# Patient Record
Sex: MALE | Race: OTHER | HISPANIC OR LATINO
[De-identification: names, ages, dates, MRNs, and addresses within clinical notes are randomized per-mention and may not be internally consistent; named-entity substitution may affect disease eponyms.]

---

## 2018-02-12 ENCOUNTER — LABORATORY RESULT (OUTPATIENT)
Age: 48
End: 2018-02-12

## 2018-02-12 ENCOUNTER — OUTPATIENT (OUTPATIENT)
Dept: OUTPATIENT SERVICES | Facility: HOSPITAL | Age: 48
LOS: 1 days | End: 2018-02-12
Payer: SELF-PAY

## 2018-02-12 ENCOUNTER — APPOINTMENT (OUTPATIENT)
Dept: INTERNAL MEDICINE | Facility: CLINIC | Age: 48
End: 2018-02-12

## 2018-02-12 VITALS
SYSTOLIC BLOOD PRESSURE: 123 MMHG | DIASTOLIC BLOOD PRESSURE: 60 MMHG | HEIGHT: 67 IN | BODY MASS INDEX: 26.06 KG/M2 | OXYGEN SATURATION: 98 % | WEIGHT: 166 LBS | HEART RATE: 72 BPM

## 2018-02-12 DIAGNOSIS — Z00.00 ENCOUNTER FOR GENERAL ADULT MEDICAL EXAMINATION W/OUT ABNORMAL FINDINGS: ICD-10-CM

## 2018-02-12 DIAGNOSIS — I10 ESSENTIAL (PRIMARY) HYPERTENSION: ICD-10-CM

## 2018-02-12 LAB
ALBUMIN SERPL ELPH-MCNC: 4.7 G/DL — SIGNIFICANT CHANGE UP (ref 3.3–5)
ALP SERPL-CCNC: 65 U/L — SIGNIFICANT CHANGE UP (ref 40–120)
ALT FLD-CCNC: 28 U/L — SIGNIFICANT CHANGE UP (ref 10–45)
ANION GAP SERPL CALC-SCNC: 16 MMOL/L — SIGNIFICANT CHANGE UP (ref 5–17)
AST SERPL-CCNC: 17 U/L — SIGNIFICANT CHANGE UP (ref 10–40)
BILIRUB SERPL-MCNC: 0.3 MG/DL — SIGNIFICANT CHANGE UP (ref 0.2–1.2)
BUN SERPL-MCNC: 17 MG/DL — SIGNIFICANT CHANGE UP (ref 7–23)
CALCIUM SERPL-MCNC: 9.4 MG/DL — SIGNIFICANT CHANGE UP (ref 8.4–10.5)
CHLORIDE SERPL-SCNC: 99 MMOL/L — SIGNIFICANT CHANGE UP (ref 96–108)
CHOLEST SERPL-MCNC: 206 MG/DL — HIGH (ref 10–199)
CO2 SERPL-SCNC: 27 MMOL/L — SIGNIFICANT CHANGE UP (ref 22–31)
CREAT SERPL-MCNC: 0.76 MG/DL — SIGNIFICANT CHANGE UP (ref 0.5–1.3)
GLUCOSE SERPL-MCNC: 86 MG/DL — SIGNIFICANT CHANGE UP (ref 70–99)
HBA1C BLD-MCNC: 5.4 % — SIGNIFICANT CHANGE UP (ref 4–5.6)
HCT VFR BLD CALC: 45.4 % — SIGNIFICANT CHANGE UP (ref 39–50)
HDLC SERPL-MCNC: 62 MG/DL — SIGNIFICANT CHANGE UP (ref 40–125)
HGB BLD-MCNC: 14.6 G/DL — SIGNIFICANT CHANGE UP (ref 13–17)
LIPID PNL WITH DIRECT LDL SERPL: 127 MG/DL — SIGNIFICANT CHANGE UP
MCHC RBC-ENTMCNC: 30.4 PG — SIGNIFICANT CHANGE UP (ref 27–34)
MCHC RBC-ENTMCNC: 32.2 GM/DL — SIGNIFICANT CHANGE UP (ref 32–36)
MCV RBC AUTO: 94.6 FL — SIGNIFICANT CHANGE UP (ref 80–100)
PLATELET # BLD AUTO: 255 K/UL — SIGNIFICANT CHANGE UP (ref 150–400)
POTASSIUM SERPL-MCNC: 4.4 MMOL/L — SIGNIFICANT CHANGE UP (ref 3.5–5.3)
POTASSIUM SERPL-SCNC: 4.4 MMOL/L — SIGNIFICANT CHANGE UP (ref 3.5–5.3)
PROT SERPL-MCNC: 7.9 G/DL — SIGNIFICANT CHANGE UP (ref 6–8.3)
RBC # BLD: 4.8 M/UL — SIGNIFICANT CHANGE UP (ref 4.2–5.8)
RBC # FLD: 13.7 % — SIGNIFICANT CHANGE UP (ref 10.3–14.5)
SODIUM SERPL-SCNC: 142 MMOL/L — SIGNIFICANT CHANGE UP (ref 135–145)
T4 FREE+ TSH PNL SERPL: 1.11 UIU/ML — SIGNIFICANT CHANGE UP (ref 0.27–4.2)
TOTAL CHOLESTEROL/HDL RATIO MEASUREMENT: 3.3 RATIO — LOW (ref 3.4–9.6)
TRIGL SERPL-MCNC: 86 MG/DL — SIGNIFICANT CHANGE UP (ref 10–149)
WBC # BLD: 5.65 K/UL — SIGNIFICANT CHANGE UP (ref 3.8–10.5)
WBC # FLD AUTO: 5.65 K/UL — SIGNIFICANT CHANGE UP (ref 3.8–10.5)

## 2018-02-12 PROCEDURE — 85027 COMPLETE CBC AUTOMATED: CPT

## 2018-02-12 PROCEDURE — 80053 COMPREHEN METABOLIC PANEL: CPT

## 2018-02-12 PROCEDURE — 90688 IIV4 VACCINE SPLT 0.5 ML IM: CPT

## 2018-02-12 PROCEDURE — G0008: CPT

## 2018-02-12 PROCEDURE — 83036 HEMOGLOBIN GLYCOSYLATED A1C: CPT

## 2018-02-12 PROCEDURE — 84443 ASSAY THYROID STIM HORMONE: CPT

## 2018-02-12 PROCEDURE — 80061 LIPID PANEL: CPT

## 2018-02-12 PROCEDURE — G0463: CPT

## 2018-02-23 DIAGNOSIS — K59.00 CONSTIPATION, UNSPECIFIED: ICD-10-CM

## 2018-02-23 DIAGNOSIS — Z23 ENCOUNTER FOR IMMUNIZATION: ICD-10-CM

## 2018-09-11 ENCOUNTER — APPOINTMENT (OUTPATIENT)
Age: 48
End: 2018-09-11

## 2018-09-11 ENCOUNTER — OUTPATIENT (OUTPATIENT)
Dept: OUTPATIENT SERVICES | Facility: HOSPITAL | Age: 48
LOS: 1 days | End: 2018-09-11

## 2019-03-13 ENCOUNTER — APPOINTMENT (OUTPATIENT)
Dept: INTERNAL MEDICINE | Facility: CLINIC | Age: 49
End: 2019-03-13

## 2019-03-13 ENCOUNTER — OUTPATIENT (OUTPATIENT)
Dept: OUTPATIENT SERVICES | Facility: HOSPITAL | Age: 49
LOS: 1 days | End: 2019-03-13
Payer: SELF-PAY

## 2019-03-13 VITALS — SYSTOLIC BLOOD PRESSURE: 110 MMHG | DIASTOLIC BLOOD PRESSURE: 70 MMHG | HEART RATE: 80 BPM

## 2019-03-13 DIAGNOSIS — I10 ESSENTIAL (PRIMARY) HYPERTENSION: ICD-10-CM

## 2019-03-13 DIAGNOSIS — Z00.00 ENCOUNTER FOR GENERAL ADULT MEDICAL EXAMINATION W/OUT ABNORMAL FINDINGS: ICD-10-CM

## 2019-03-13 DIAGNOSIS — L85.3 XEROSIS CUTIS: ICD-10-CM

## 2019-03-13 PROCEDURE — 90688 IIV4 VACCINE SPLT 0.5 ML IM: CPT

## 2019-03-13 PROCEDURE — G0008: CPT

## 2019-03-13 PROCEDURE — G0463: CPT

## 2019-03-13 NOTE — HISTORY REVIEWED
[Medications and Allergies reviewed] : Medications and allergies reviewed. [History reviewed] : History reviewed.

## 2019-03-14 NOTE — HISTORY OF PRESENT ILLNESS
[Health Maintenance] : health maintenance [___ Year(s) Ago] : [unfilled] year(s) ago [Good] : good [Sexually Active] : the patient is sexually active [Reviewed and Current] : risk screening reviewed and current [Up to Date] : up to date [Reg. Dental Visits] : He does not have regular dental visits [Vision Problems] : He denies vision problems [Hearing Loss] : He denies hearing loss [Healthy Diet] : He does not have a healthy diet [Weight Concerns] : He does not have any weight concerns [Regular Exercise] : He does not exercise regularly [Tobacco Use] : He does not use tobacco [Drug Abuse] : He denies drug use [FreeTextEntry1] : Annual CPE [de-identified] : Patient is a 48 yr-old male with a PMHx inguinal hernia s/p repair, hemorrhoid, vitiligo, constipation presents for CPE. Patient has no acute complaints today. Patient has one episode of constipation every few months and noted trace amount of blood in stool after prolonged straining. He reports normal colonoscopy in the past. He is in usual state of health, reports excoriations, dry skin on legs and back and dandruff. No fever, chills, night sweats, chest pain, dizziness, palpitations, unintentional weight loss, abdominal pain, black stool, diarrhea/mucus, dysuria, urinary frequency.\par \par Social hx: patient works as a . Lives with wife and children. Patient denies any hx of STD. No cigarette smoking or illicit drug use. Social drinking. \par Family hx: paternal uncle has prostate cancer. Denies family hx of colon cancer. \par \par

## 2019-03-14 NOTE — REVIEW OF SYSTEMS
[Constipation] : constipation [Fever] : no fever [Chills] : no chills [Feeling Poorly] : not feeling poorly [Feeling Tired] : not feeling tired [Eye Pain] : no eye pain [Earache] : no earache [Heart Rate Is Slow] : the heart rate was not slow [Heart Rate Is Fast] : the heart rate was not fast [Shortness Of Breath] : no shortness of breath [Abdominal Pain] : no abdominal pain [Vomiting] : no vomiting [Dysuria] : no dysuria [Arthralgias] : no arthralgias [Joint Pain] : no joint pain [Skin Lesions] : no skin lesions [Skin Wound] : no skin wound [Confused] : no confusion [Suicidal] : not suicidal [Sleep Disturbances] : no sleep disturbances [Anxiety] : no anxiety [Depression] : no depression [Proptosis] : no proptosis [Hot Flashes] : no hot flashes

## 2019-03-14 NOTE — ASSESSMENT
[FreeTextEntry1] : Patient is a 47 yr-old male with a past medical history of  inguinal hernia s/p repair & hemorrhoid, vitiligo, here for CPE\par \par #BRBPR: associated with constipation and valsalva with BMs. WIll need to rule out other etiologies, including diverticuloses and cancer\par - Anal exam today notable for genital/anal vitiligo but no evidence of external hemorrhoids\par - FIT test and referral given\par \par #Constipation: was on colace previously\par -Prescribed miralax BID as needed for constipation\par -Patient advised to stay hydrate and eat a fiber-rich diet\par - Colonoscopy referral given\par \par #Dry Skin: with excoriation\par -Aquaphore prescribed, instructed to avoid scartching\par -Counseled on using Dove soap\par \par # HCM\par - Patient inquired about PSA screening. Patient currently does not have any symptoms of prostate cancer: wt loss fatigue, weak stream, hematuria, back pain. Discussed with patient the option of PSA screening that PSA is not recommend by [facility]. Patient decided to proceed with PSA screening given his uncle has h/o prostate cancer with the understanding it mightbe flasely positive and may lead to further unnecessary invasivstesting.\par -Flu vaccine given today\par -Check CBC, CMP, A1c, lipid profile \par

## 2019-03-14 NOTE — PHYSICAL EXAM
[General Appearance - Alert] : alert [General Appearance - In No Acute Distress] : in no acute distress [General Appearance - Well Nourished] : well nourished [General Appearance - Well Developed] : well developed [General Appearance - Well-Appearing] : healthy appearing [Sclera] : the sclera and conjunctiva were normal [PERRL With Normal Accommodation] : pupils were equal in size, round, and reactive to light [Extraocular Movements] : extraocular movements were intact [Strabismus] : no strabismus was seen [Optic Disc Abnormality] : the optic disc were normal in size and color [Retina] : no retinal hemorrhages, vessel changes or exudates seen on fundoscopic exam [Outer Ear] : the ears and nose were normal in appearance [Hearing Threshold Finger Rub Not Moody] : hearing was normal [Examination Of The Oral Cavity] : the lips and gums were normal [Both Tympanic Membranes Were Examined] : both tympanic membranes were normal [Nasal Cavity] : the nasal mucosa and septum were normal [Oropharynx] : the oropharynx was normal [Neck Appearance] : the appearance of the neck was normal [Neck Cervical Mass (___cm)] : no neck mass was observed [Jugular Venous Distention Increased] : there was no jugular-venous distention [Thyroid Diffuse Enlargement] : the thyroid was not enlarged [Thyroid Nodule] : there were no palpable thyroid nodules [] : no respiratory distress [Respiration, Rhythm And Depth] : normal respiratory rhythm and effort [Exaggerated Use Of Accessory Muscles For Inspiration] : no accessory muscle use [Auscultation Breath Sounds / Voice Sounds] : lungs were clear to auscultation bilaterally [Chest Palpation] : palpation of the chest revealed no abnormalities [Heart Rate And Rhythm] : heart rate was normal and rhythm regular [Heart Sounds] : normal S1 and S2 [Heart Sounds Gallop] : no gallops [Murmurs] : no murmurs [Heart Sounds Pericardial Friction Rub] : no pericardial rub [Arterial Pulses Carotid] : carotid pulses were normal with no bruits [Edema] : there was no peripheral edema [Veins - Varicosity Changes] : there were no varicosital changes [Bowel Sounds] : normal bowel sounds [Abdomen Soft] : soft [Abdomen Tenderness] : non-tender [Cervical Lymph Nodes Enlarged Posterior Bilaterally] : posterior cervical [Cervical Lymph Nodes Enlarged Anterior Bilaterally] : anterior cervical [Supraclavicular Lymph Nodes Enlarged Bilaterally] : supraclavicular [No Spinal Tenderness] : no spinal tenderness [Abnormal Walk] : normal gait [Nail Clubbing] : no clubbing  or cyanosis of the fingernails [Musculoskeletal - Swelling] : no joint swelling seen [Motor Tone] : muscle strength and tone were normal [Skin Color & Pigmentation] : normal skin color and pigmentation [Skin Turgor] : normal skin turgor [Sensation] : the sensory exam was normal to light touch and pinprick [Motor Exam] : the motor exam was normal [No Focal Deficits] : no focal deficits [Oriented To Time, Place, And Person] : oriented to person, place, and time [Affect] : the affect was normal [Mood] : the mood was normal [Full Pulse] : the pedal pulses are present [FreeTextEntry1] : dry skin, L exocriation with superficial peeling

## 2019-03-14 NOTE — HEALTH RISK ASSESSMENT
[Good] : ~his/her~  mood as  good [0-5] : 0-5 [Patient reported colonoscopy was normal] : Patient reported colonoscopy was normal [] : No

## 2019-03-15 DIAGNOSIS — K92.1 MELENA: ICD-10-CM

## 2019-03-15 DIAGNOSIS — L85.3 XEROSIS CUTIS: ICD-10-CM

## 2019-03-15 DIAGNOSIS — K59.00 CONSTIPATION, UNSPECIFIED: ICD-10-CM

## 2019-06-11 ENCOUNTER — APPOINTMENT (OUTPATIENT)
Dept: GASTROENTEROLOGY | Facility: HOSPITAL | Age: 49
End: 2019-06-11

## 2019-06-11 ENCOUNTER — OUTPATIENT (OUTPATIENT)
Dept: OUTPATIENT SERVICES | Facility: HOSPITAL | Age: 49
LOS: 1 days | End: 2019-06-11
Payer: SELF-PAY

## 2019-06-11 VITALS
HEIGHT: 67 IN | BODY MASS INDEX: 26.06 KG/M2 | WEIGHT: 166 LBS | DIASTOLIC BLOOD PRESSURE: 84 MMHG | HEART RATE: 66 BPM | SYSTOLIC BLOOD PRESSURE: 156 MMHG | RESPIRATION RATE: 14 BRPM

## 2019-06-11 DIAGNOSIS — K59.00 CONSTIPATION, UNSPECIFIED: ICD-10-CM

## 2019-06-11 DIAGNOSIS — Z87.891 PERSONAL HISTORY OF NICOTINE DEPENDENCE: ICD-10-CM

## 2019-06-11 DIAGNOSIS — K92.1 MELENA: ICD-10-CM

## 2019-06-11 DIAGNOSIS — K31.9 DISEASE OF STOMACH AND DUODENUM, UNSPECIFIED: ICD-10-CM

## 2019-06-11 PROCEDURE — G0463: CPT

## 2019-06-11 NOTE — PHYSICAL EXAM
[General Appearance - Well Nourished] : well nourished [Sclera] : the sclera and conjunctiva were normal [General Appearance - Well Developed] : well developed [PERRL With Normal Accommodation] : pupils were equal in size, round, and reactive to light [Extraocular Movements] : extraocular movements were intact [Hearing Threshold Finger Rub Not [place]] : hearing was normal [Outer Ear] : the ears and nose were normal in appearance [Neck Appearance] : the appearance of the neck was normal [Neck Cervical Mass (___cm)] : no neck mass was observed [Respiration, Rhythm And Depth] : normal respiratory rhythm and effort [Exaggerated Use Of Accessory Muscles For Inspiration] : no accessory muscle use [Auscultation Breath Sounds / Voice Sounds] : lungs were clear to auscultation bilaterally [Heart Sounds] : normal S1 and S2 [Murmurs] : no murmurs [Heart Sounds Pericardial Friction Rub] : no pericardial rub [Bowel Sounds] : normal bowel sounds [Abdomen Soft] : soft [Abdomen Tenderness] : non-tender [Cervical Lymph Nodes Enlarged Posterior Bilaterally] : posterior cervical [Cervical Lymph Nodes Enlarged Anterior Bilaterally] : anterior cervical [Supraclavicular Lymph Nodes Enlarged Bilaterally] : supraclavicular [Nail Clubbing] : no clubbing  or cyanosis of the fingernails [Skin Turgor] : normal skin turgor [Motor Tone] : muscle strength and tone were normal [] : no rash [Sensation] : the sensory exam was normal to light touch and pinprick [Motor Exam] : the motor exam was normal [Mood] : the mood was normal [Affect] : the affect was normal

## 2019-06-11 NOTE — HISTORY OF PRESENT ILLNESS
[de-identified] : 48 yr-old male with a PMHx inguinal hernia s/p repair presents with intermittent trace hematochezia over the past 2 years. Pt states that he used to constipated, but lately has been taking a lot water and fiber (fruits, vegetables) with resultant 1 soft BM a day. Pt denies straining or dyschezia. The patient denies heartburn, dyspepsia, dysphagia, change in bowel habit, melena, weight loss, anemia or hematemesis. Pt denies family history of gallstones, colorectal cancer, polyps, breast, ovarian cancer. \par \par Pt's last colonoscopy was 2013- at that time showed internal hemorrhoids\par EGD 2009-normal.

## 2019-06-11 NOTE — ASSESSMENT
[FreeTextEntry1] : Impression:\par 1) Constipation- resolved with fiber. Differential diagnosis: slow transit constipation, dyssynergic defecation, IBS\par 2) Intermittent hematochezia- Differential diagnosis includes diverticulosis, anal fissure/hemorrhoids, angiectasia, colorectal cancer\par \par Recommendations:\par -Plan for colonoscopy\par Risks and benefits of the procedure, including: perforation (tear in the colon), bleeding, reaction to sedation, and possible risk for surgical resection and colostomy bag explained\par \par

## 2019-06-27 ENCOUNTER — OUTPATIENT (OUTPATIENT)
Dept: OUTPATIENT SERVICES | Facility: HOSPITAL | Age: 49
LOS: 1 days | End: 2019-06-27
Payer: SELF-PAY

## 2019-06-27 DIAGNOSIS — K59.00 CONSTIPATION, UNSPECIFIED: ICD-10-CM

## 2019-06-27 PROCEDURE — 45378 DIAGNOSTIC COLONOSCOPY: CPT | Mod: GC

## 2019-06-28 PROCEDURE — 45378 DIAGNOSTIC COLONOSCOPY: CPT

## 2020-08-04 ENCOUNTER — LABORATORY RESULT (OUTPATIENT)
Age: 50
End: 2020-08-04

## 2020-08-04 ENCOUNTER — OUTPATIENT (OUTPATIENT)
Dept: OUTPATIENT SERVICES | Facility: HOSPITAL | Age: 50
LOS: 1 days | End: 2020-08-04
Payer: SELF-PAY

## 2020-08-04 ENCOUNTER — APPOINTMENT (OUTPATIENT)
Dept: INTERNAL MEDICINE | Facility: CLINIC | Age: 50
End: 2020-08-04

## 2020-08-04 VITALS
DIASTOLIC BLOOD PRESSURE: 72 MMHG | WEIGHT: 167 LBS | BODY MASS INDEX: 26.21 KG/M2 | SYSTOLIC BLOOD PRESSURE: 124 MMHG | HEIGHT: 67 IN

## 2020-08-04 DIAGNOSIS — I10 ESSENTIAL (PRIMARY) HYPERTENSION: ICD-10-CM

## 2020-08-04 DIAGNOSIS — K64.9 UNSPECIFIED HEMORRHOIDS: ICD-10-CM

## 2020-08-04 DIAGNOSIS — L80 VITILIGO: ICD-10-CM

## 2020-08-04 PROCEDURE — 86706 HEP B SURFACE ANTIBODY: CPT

## 2020-08-04 PROCEDURE — 36415 COLL VENOUS BLD VENIPUNCTURE: CPT

## 2020-08-04 PROCEDURE — 85027 COMPLETE CBC AUTOMATED: CPT

## 2020-08-04 PROCEDURE — 86480 TB TEST CELL IMMUN MEASURE: CPT

## 2020-08-04 PROCEDURE — 86705 HEP B CORE ANTIBODY IGM: CPT

## 2020-08-04 PROCEDURE — 86704 HEP B CORE ANTIBODY TOTAL: CPT

## 2020-08-04 PROCEDURE — G0463: CPT

## 2020-08-04 PROCEDURE — 86762 RUBELLA ANTIBODY: CPT

## 2020-08-04 PROCEDURE — 86765 RUBEOLA ANTIBODY: CPT

## 2020-08-04 PROCEDURE — 87340 HEPATITIS B SURFACE AG IA: CPT

## 2020-08-04 PROCEDURE — 83036 HEMOGLOBIN GLYCOSYLATED A1C: CPT

## 2020-08-04 PROCEDURE — 86735 MUMPS ANTIBODY: CPT

## 2020-08-04 PROCEDURE — 80053 COMPREHEN METABOLIC PANEL: CPT

## 2020-08-04 PROCEDURE — 87389 HIV-1 AG W/HIV-1&-2 AB AG IA: CPT

## 2020-08-05 LAB
A1C WITH ESTIMATED AVERAGE GLUCOSE RESULT: 5.4 % — SIGNIFICANT CHANGE UP (ref 4–5.6)
ALBUMIN SERPL ELPH-MCNC: 4.5 G/DL — SIGNIFICANT CHANGE UP (ref 3.3–5)
ALP SERPL-CCNC: 74 U/L — SIGNIFICANT CHANGE UP (ref 40–120)
ALT FLD-CCNC: 20 U/L — SIGNIFICANT CHANGE UP (ref 10–45)
ANION GAP SERPL CALC-SCNC: 11 MMOL/L — SIGNIFICANT CHANGE UP (ref 5–17)
AST SERPL-CCNC: 18 U/L — SIGNIFICANT CHANGE UP (ref 10–40)
BILIRUB SERPL-MCNC: 0.4 MG/DL — SIGNIFICANT CHANGE UP (ref 0.2–1.2)
BUN SERPL-MCNC: 14 MG/DL — SIGNIFICANT CHANGE UP (ref 7–23)
CALCIUM SERPL-MCNC: 9 MG/DL — SIGNIFICANT CHANGE UP (ref 8.4–10.5)
CHLORIDE SERPL-SCNC: 103 MMOL/L — SIGNIFICANT CHANGE UP (ref 96–108)
CO2 SERPL-SCNC: 24 MMOL/L — SIGNIFICANT CHANGE UP (ref 22–31)
CREAT SERPL-MCNC: 0.66 MG/DL — SIGNIFICANT CHANGE UP (ref 0.5–1.3)
ESTIMATED AVERAGE GLUCOSE: 108 MG/DL — SIGNIFICANT CHANGE UP (ref 68–114)
GLUCOSE SERPL-MCNC: 92 MG/DL — SIGNIFICANT CHANGE UP (ref 70–99)
HBV CORE AB SER-ACNC: SIGNIFICANT CHANGE UP
HBV CORE IGM SER-ACNC: SIGNIFICANT CHANGE UP
HBV SURFACE AB SER-ACNC: SIGNIFICANT CHANGE UP
HBV SURFACE AG SER-ACNC: SIGNIFICANT CHANGE UP
HCT VFR BLD CALC: 44.3 % — SIGNIFICANT CHANGE UP (ref 39–50)
HGB BLD-MCNC: 14.5 G/DL — SIGNIFICANT CHANGE UP (ref 13–17)
HIV 1+2 AB+HIV1 P24 AG SERPL QL IA: SIGNIFICANT CHANGE UP
MCHC RBC-ENTMCNC: 30.1 PG — SIGNIFICANT CHANGE UP (ref 27–34)
MCHC RBC-ENTMCNC: 32.7 GM/DL — SIGNIFICANT CHANGE UP (ref 32–36)
MCV RBC AUTO: 92.1 FL — SIGNIFICANT CHANGE UP (ref 80–100)
MEV IGG SER-ACNC: 280 AU/ML — SIGNIFICANT CHANGE UP
MEV IGG+IGM SER-IMP: POSITIVE — SIGNIFICANT CHANGE UP
MUV AB SER-ACNC: POSITIVE — SIGNIFICANT CHANGE UP
MUV IGG FLD-ACNC: 21.1 AU/ML — SIGNIFICANT CHANGE UP
PLATELET # BLD AUTO: 282 K/UL — SIGNIFICANT CHANGE UP (ref 150–400)
POTASSIUM SERPL-MCNC: 4.3 MMOL/L — SIGNIFICANT CHANGE UP (ref 3.5–5.3)
POTASSIUM SERPL-SCNC: 4.3 MMOL/L — SIGNIFICANT CHANGE UP (ref 3.5–5.3)
PROT SERPL-MCNC: 7 G/DL — SIGNIFICANT CHANGE UP (ref 6–8.3)
RBC # BLD: 4.81 M/UL — SIGNIFICANT CHANGE UP (ref 4.2–5.8)
RBC # FLD: 13.2 % — SIGNIFICANT CHANGE UP (ref 10.3–14.5)
RUBV IGG SER-ACNC: 2.2 INDEX — SIGNIFICANT CHANGE UP
RUBV IGG SER-IMP: POSITIVE — SIGNIFICANT CHANGE UP
SODIUM SERPL-SCNC: 138 MMOL/L — SIGNIFICANT CHANGE UP (ref 135–145)
WBC # BLD: 5.17 K/UL — SIGNIFICANT CHANGE UP (ref 3.8–10.5)
WBC # FLD AUTO: 5.17 K/UL — SIGNIFICANT CHANGE UP (ref 3.8–10.5)

## 2020-08-05 RX ORDER — WHITE PETROLATUM 1.75 OZ
OINTMENT TOPICAL TWICE DAILY
Qty: 1 | Refills: 2 | Status: DISCONTINUED | COMMUNITY
Start: 2019-03-13 | End: 2020-08-05

## 2020-08-05 RX ORDER — POLYETHYLENE GLYCOL 3350 AND ELECTROLYTES WITH LEMON FLAVOR 236; 22.74; 6.74; 5.86; 2.97 G/4L; G/4L; G/4L; G/4L; G/4L
236 POWDER, FOR SOLUTION ORAL
Qty: 1 | Refills: 0 | Status: DISCONTINUED | COMMUNITY
Start: 2019-06-11 | End: 2020-08-05

## 2020-08-05 RX ORDER — POLYETHYLENE GLYCOL 3350 17 G/17G
17 POWDER, FOR SOLUTION ORAL TWICE DAILY
Qty: 1 | Refills: 1 | Status: DISCONTINUED | COMMUNITY
Start: 2019-03-13 | End: 2020-08-05

## 2020-08-06 LAB
GAMMA INTERFERON BACKGROUND BLD IA-ACNC: 0.02 IU/ML — SIGNIFICANT CHANGE UP
M TB IFN-G BLD-IMP: NEGATIVE — SIGNIFICANT CHANGE UP
M TB IFN-G CD4+ BCKGRND COR BLD-ACNC: -0.01 IU/ML — SIGNIFICANT CHANGE UP
M TB IFN-G CD4+CD8+ BCKGRND COR BLD-ACNC: -0.01 IU/ML — SIGNIFICANT CHANGE UP
QUANT TB PLUS MITOGEN MINUS NIL: 8.21 IU/ML — SIGNIFICANT CHANGE UP

## 2020-08-07 NOTE — HEALTH RISK ASSESSMENT
[Very Good] : ~his/her~  mood as very good [Monthly or less (1 pt)] : Monthly or less (1 point) [1 or 2 (0 pts)] : 1 or 2 (0 points) [Never (0 pts)] : Never (0 points) [0] : 2) Feeling down, depressed, or hopeless: Not at all (0) [Patient reported colonoscopy was abnormal] : Patient reported colonoscopy was abnormal [HIV Test offered] : HIV Test offered [None] : None [With Family] : lives with family [Employed] : employed [] :  [Feels Safe at Home] : Feels safe at home [Fully functional (bathing, dressing, toileting, transferring, walking, feeding)] : Fully functional (bathing, dressing, toileting, transferring, walking, feeding) [Fully functional (using the telephone, shopping, preparing meals, housekeeping, doing laundry, using] : Fully functional and needs no help or supervision to perform IADLs (using the telephone, shopping, preparing meals, housekeeping, doing laundry, using transportation, managing medications and managing finances) [] : No [[id number]] : 0 [Change in mental status noted] : No change in mental status noted [Language] : denies difficulty with language [Behavior] : denies difficulty with behavior [Reasoning] : denies difficulty with reasoning [Handling Complex Tasks] : denies difficulty handling complex tasks [ColonoscopyDate] : 06/2019 [FreeTextEntry2] :  [ColonoscopyComments] : internal & external hemorrhoids

## 2020-08-07 NOTE — ASSESSMENT
[FreeTextEntry1] : 49M with hemorrhoids 2/2 constipation/straining otherwise healthy presents for CPE\par \par Doing well\par \par Will check routine labs today, A1c.\par \par Constipation stable, Dulcolax as needed.\par \par Vitiligo non-bothersome, limited in area, actually states it is regressing. Dermatology referral placed if patient desires follow up, previously used a cream and light therapy\par \par Up to date on colonoscopy, 2019 showing hemorrhoids.\par \par Flu shot and Zoster at next visit.\par \par Had shared decision making regarding prostate screening given family history, currently asymptomatic, decided not to do PSA.\par \par Also will send for vaccine titers given he is applying for immigration status and will need these labs.\par \par RTC as needed, f/u in 6 months. \par \par He will call later this week for results to be sent to him and his .

## 2020-08-07 NOTE — PHYSICAL EXAM
[No Acute Distress] : no acute distress [Well Nourished] : well nourished [Well Developed] : well developed [Normal Sclera/Conjunctiva] : normal sclera/conjunctiva [PERRL] : pupils equal round and reactive to light [EOMI] : extraocular movements intact [Normal Outer Ear/Nose] : the outer ears and nose were normal in appearance [Normal TMs] : both tympanic membranes were normal [Normal Oropharynx] : the oropharynx was normal [No JVD] : no jugular venous distention [No Lymphadenopathy] : no lymphadenopathy [Supple] : supple [Thyroid Normal, No Nodules] : the thyroid was normal and there were no nodules present [No Respiratory Distress] : no respiratory distress  [No Accessory Muscle Use] : no accessory muscle use [Normal Rate] : normal rate  [Regular Rhythm] : with a regular rhythm [Normal S1, S2] : normal S1 and S2 [No Murmur] : no murmur heard [No Edema] : there was no peripheral edema [Soft] : abdomen soft [Non Tender] : non-tender [Non-distended] : non-distended [Normal Supraclavicular Nodes] : no supraclavicular lymphadenopathy [Normal Axillary Nodes] : no axillary lymphadenopathy [Normal Posterior Cervical Nodes] : no posterior cervical lymphadenopathy [Normal Anterior Cervical Nodes] : no anterior cervical lymphadenopathy [No CVA Tenderness] : no CVA  tenderness [No Spinal Tenderness] : no spinal tenderness [Coordination Grossly Intact] : coordination grossly intact [No Focal Deficits] : no focal deficits [Normal Gait] : normal gait [Speech Grossly Normal] : speech grossly normal [Memory Grossly Normal] : memory grossly normal [Normal Affect] : the affect was normal [Alert and Oriented x3] : oriented to person, place, and time [Normal Insight/Judgement] : insight and judgment were intact [de-identified] : hypopogmented pathces intermittently

## 2020-08-07 NOTE — HISTORY OF PRESENT ILLNESS
[FreeTextEntry1] : CPE [de-identified] : Patient is a 48 yr-old male with a PMHx inguinal hernia s/p repair, hemorrhoid, vitiligo, constipation presents for CPE. Patient has no acute complaints today. Patient has rare constipation every few months and noted trace amount of blood in stool after prolonged straining. He reports normal colonoscopy in the past with internal hemorrhoids. He is in usual state of health, No unintentional weight lossNo fever, chills, night sweats, chest pain, dizziness, palpitations, unintentional weight loss, abdominal pain, black stool, diarrhea/mucus, dysuria, urinary frequency. Uses Dulcolax prn, otherwise no meds. Had colonoscopy with GI which showed hemorrhoids internal and external, otherwise normal.\par \par Social hx: patient works as a . Lives with wife and children. Patient denies any hx of STD. No cigarette smoking or illicit drug use. Social drinking. \par Family hx: paternal uncle has prostate cancer. Denies family hx of colon cancer. \par Surgical hx: hernia repair\par \par Eats a well rounded diet consisting of home cooked meals, minimal fried foods, weight has been stable. Goes for walks. Feels safe in the home, manages ADLs independently. Of note, he is applying for a green card.\par

## 2020-08-07 NOTE — REVIEW OF SYSTEMS
[Skin Rash] : skin rash [Negative] : Heme/Lymph [Fever] : no fever [Chills] : no chills [Vision Problems] : no vision problems [Recent Change In Weight] : ~T no recent weight change [Hearing Loss] : no hearing loss [Nasal Discharge] : no nasal discharge [Chest Pain] : no chest pain [Sore Throat] : no sore throat [Shortness Of Breath] : no shortness of breath [Palpitations] : no palpitations [Nausea] : no nausea [Abdominal Pain] : no abdominal pain [Vomiting] : no vomiting [Constipation] : no constipation [Diarrhea] : no diarrhea [Incontinence] : no incontinence [Dysuria] : no dysuria [Melena] : no melena [Hematuria] : no hematuria [de-identified] : vitiligo

## 2021-03-30 ENCOUNTER — EMERGENCY (EMERGENCY)
Facility: HOSPITAL | Age: 51
LOS: 1 days | Discharge: HOME | End: 2021-03-30
Attending: EMERGENCY MEDICINE
Payer: MEDICAID

## 2021-03-30 VITALS
WEIGHT: 163.14 LBS | SYSTOLIC BLOOD PRESSURE: 96 MMHG | OXYGEN SATURATION: 98 % | TEMPERATURE: 97 F | HEART RATE: 55 BPM | RESPIRATION RATE: 18 BRPM | HEIGHT: 66 IN | DIASTOLIC BLOOD PRESSURE: 55 MMHG

## 2021-03-30 VITALS
SYSTOLIC BLOOD PRESSURE: 105 MMHG | RESPIRATION RATE: 18 BRPM | TEMPERATURE: 98 F | DIASTOLIC BLOOD PRESSURE: 70 MMHG | HEART RATE: 60 BPM | OXYGEN SATURATION: 99 %

## 2021-03-30 DIAGNOSIS — Z98.890 OTHER SPECIFIED POSTPROCEDURAL STATES: Chronic | ICD-10-CM

## 2021-03-30 LAB
ALBUMIN SERPL ELPH-MCNC: 4.3 G/DL — SIGNIFICANT CHANGE UP (ref 3.3–5)
ALP SERPL-CCNC: 126 U/L — HIGH (ref 40–120)
ALT FLD-CCNC: 207 U/L — HIGH (ref 10–45)
ANION GAP SERPL CALC-SCNC: 10 MMOL/L — SIGNIFICANT CHANGE UP (ref 5–17)
AST SERPL-CCNC: 319 U/L — HIGH (ref 10–40)
BASOPHILS # BLD AUTO: 0.03 K/UL — SIGNIFICANT CHANGE UP (ref 0–0.2)
BASOPHILS NFR BLD AUTO: 0.3 % — SIGNIFICANT CHANGE UP (ref 0–2)
BILIRUB SERPL-MCNC: 0.5 MG/DL — SIGNIFICANT CHANGE UP (ref 0.2–1.2)
BUN SERPL-MCNC: 24 MG/DL — HIGH (ref 7–23)
CALCIUM SERPL-MCNC: 9.2 MG/DL — SIGNIFICANT CHANGE UP (ref 8.4–10.5)
CHLORIDE SERPL-SCNC: 105 MMOL/L — SIGNIFICANT CHANGE UP (ref 96–108)
CO2 SERPL-SCNC: 25 MMOL/L — SIGNIFICANT CHANGE UP (ref 22–31)
CREAT SERPL-MCNC: 0.69 MG/DL — SIGNIFICANT CHANGE UP (ref 0.5–1.3)
EOSINOPHIL # BLD AUTO: 0.05 K/UL — SIGNIFICANT CHANGE UP (ref 0–0.5)
EOSINOPHIL NFR BLD AUTO: 0.5 % — SIGNIFICANT CHANGE UP (ref 0–6)
GLUCOSE SERPL-MCNC: 121 MG/DL — HIGH (ref 70–99)
HCT VFR BLD CALC: 39.6 % — SIGNIFICANT CHANGE UP (ref 39–50)
HGB BLD-MCNC: 13.3 G/DL — SIGNIFICANT CHANGE UP (ref 13–17)
IMM GRANULOCYTES NFR BLD AUTO: 0.3 % — SIGNIFICANT CHANGE UP (ref 0–1.5)
LIDOCAIN IGE QN: 27 U/L — SIGNIFICANT CHANGE UP (ref 7–60)
LYMPHOCYTES # BLD AUTO: 1.3 K/UL — SIGNIFICANT CHANGE UP (ref 1–3.3)
LYMPHOCYTES # BLD AUTO: 11.8 % — LOW (ref 13–44)
MCHC RBC-ENTMCNC: 30.5 PG — SIGNIFICANT CHANGE UP (ref 27–34)
MCHC RBC-ENTMCNC: 33.6 GM/DL — SIGNIFICANT CHANGE UP (ref 32–36)
MCV RBC AUTO: 90.8 FL — SIGNIFICANT CHANGE UP (ref 80–100)
MONOCYTES # BLD AUTO: 0.49 K/UL — SIGNIFICANT CHANGE UP (ref 0–0.9)
MONOCYTES NFR BLD AUTO: 4.5 % — SIGNIFICANT CHANGE UP (ref 2–14)
NEUTROPHILS # BLD AUTO: 9.1 K/UL — HIGH (ref 1.8–7.4)
NEUTROPHILS NFR BLD AUTO: 82.6 % — HIGH (ref 43–77)
NRBC # BLD: 0 /100 WBCS — SIGNIFICANT CHANGE UP (ref 0–0)
PLATELET # BLD AUTO: 218 K/UL — SIGNIFICANT CHANGE UP (ref 150–400)
POTASSIUM SERPL-MCNC: 3.8 MMOL/L — SIGNIFICANT CHANGE UP (ref 3.5–5.3)
POTASSIUM SERPL-SCNC: 3.8 MMOL/L — SIGNIFICANT CHANGE UP (ref 3.5–5.3)
PROT SERPL-MCNC: 7.5 G/DL — SIGNIFICANT CHANGE UP (ref 6–8.3)
RBC # BLD: 4.36 M/UL — SIGNIFICANT CHANGE UP (ref 4.2–5.8)
RBC # FLD: 13.4 % — SIGNIFICANT CHANGE UP (ref 10.3–14.5)
SARS-COV-2 RNA SPEC QL NAA+PROBE: DETECTED
SODIUM SERPL-SCNC: 140 MMOL/L — SIGNIFICANT CHANGE UP (ref 135–145)
WBC # BLD: 11 K/UL — HIGH (ref 3.8–10.5)
WBC # FLD AUTO: 11 K/UL — HIGH (ref 3.8–10.5)

## 2021-03-30 PROCEDURE — 83690 ASSAY OF LIPASE: CPT

## 2021-03-30 PROCEDURE — 99285 EMERGENCY DEPT VISIT HI MDM: CPT

## 2021-03-30 PROCEDURE — 99284 EMERGENCY DEPT VISIT MOD MDM: CPT | Mod: 25

## 2021-03-30 PROCEDURE — 76705 ECHO EXAM OF ABDOMEN: CPT

## 2021-03-30 PROCEDURE — U0005: CPT

## 2021-03-30 PROCEDURE — U0003: CPT

## 2021-03-30 PROCEDURE — 85025 COMPLETE CBC W/AUTO DIFF WBC: CPT

## 2021-03-30 PROCEDURE — 76705 ECHO EXAM OF ABDOMEN: CPT | Mod: 26,RT

## 2021-03-30 PROCEDURE — 96374 THER/PROPH/DIAG INJ IV PUSH: CPT

## 2021-03-30 PROCEDURE — 80053 COMPREHEN METABOLIC PANEL: CPT

## 2021-03-30 RX ORDER — PIPERACILLIN AND TAZOBACTAM 4; .5 G/20ML; G/20ML
3.38 INJECTION, POWDER, LYOPHILIZED, FOR SOLUTION INTRAVENOUS ONCE
Refills: 0 | Status: COMPLETED | OUTPATIENT
Start: 2021-03-30 | End: 2021-03-30

## 2021-03-30 RX ORDER — SODIUM CHLORIDE 9 MG/ML
1000 INJECTION INTRAMUSCULAR; INTRAVENOUS; SUBCUTANEOUS ONCE
Refills: 0 | Status: COMPLETED | OUTPATIENT
Start: 2021-03-30 | End: 2021-03-30

## 2021-03-30 RX ADMIN — PIPERACILLIN AND TAZOBACTAM 200 GRAM(S): 4; .5 INJECTION, POWDER, LYOPHILIZED, FOR SOLUTION INTRAVENOUS at 20:35

## 2021-03-30 RX ADMIN — SODIUM CHLORIDE 1000 MILLILITER(S): 9 INJECTION INTRAMUSCULAR; INTRAVENOUS; SUBCUTANEOUS at 20:35

## 2021-03-30 NOTE — CONSULT NOTE ADULT - ATTENDING COMMENTS
pt chart and imaging reviewed  agree with note above  us concerning for possible acute kamila  pt denies any pain currently  pt prefers to go home and f/u as outpt for elective lap kamila  if worsening pain, n/v fevers counselled to return to ER

## 2021-03-30 NOTE — ED PROVIDER NOTE - CLINICAL SUMMARY MEDICAL DECISION MAKING FREE TEXT BOX
50 year old M with pshx of right hernia repair p/w abdominal pain x3 days. C/w RUQ etiology. Low concern for acute appendicitis or urogenital source. Of note, pt had hernia repair, however, right now has no pain in the area and no evidence of obstruction. Plan for f/u of US as blood work suggests transaminitis. AST of 319, ALT of 207, bili isn't elevated (0.5) and lipase is normal. Most likely, cholelithiasis without cholecystitis. Likely DC if US is negative with surgical f/u outpt.

## 2021-03-30 NOTE — ED PROVIDER NOTE - PROGRESS NOTE DETAILS
Surgery resident Celso called back after evaluated pt, states pt can be discharged and f/u with Dr. Patel Regan within 1 week to schedule outpatient elective cholecystectomy. Will PO challenge and reassess. - ALYSE LopezC Surgery resident Celso called back after evaluated pt, states pt can be discharged and f/u with Dr. Patel Regan within 1 week to schedule outpatient elective cholecystectomy. Will PO challenge and reassess. Used  [place] ID #[id number] to make pt aware of all results, need for outpt surgery f/u with Dr. Regan for elective cholecystectomy and need to call tomorrow to schedule appt this week, additional made aware of strict return precautions. Pt acknowledged understanding of all of this, all questions answered. No current pain, will PO challenge and if tolerates will d/c home. - ALYSE LopezC Tolerated PO. reiterated all instructions as above, stable for d/c. - Chemo Wolf PA-C Tolerated PO. reiterated all instructions as above, stable for d/c. Pt aware to avoid tylenol given LFTs and need to f/u with surgery. - ALYSE LopezC

## 2021-03-30 NOTE — ED ADULT NURSE NOTE - OBJECTIVE STATEMENT
50 y male presents from home with abd pain beginning 3 days ago worsening today 8/10 RUQ 3 hours PTA. Patient reports to pain relieved PTA; denies N/V/D, CP, SOB, lightheadedness, dizziness, fevers, chills, cough. A&Ox3. Skin warm dry and intact. Breathing comfortably in bed- no distress. Abdomen soft nontender nondistended. Safety maintained- bed locked in lowest position. Patient received from -doc; VSS.

## 2021-03-30 NOTE — ED ADULT NURSE NOTE - NSIMPLEMENTINTERV_GEN_ALL_ED
Implemented All Universal Safety Interventions:  [place] to call system. Call bell, personal items and telephone within reach. Instruct patient to call for assistance. Room bathroom lighting operational. Non-slip footwear when patient is off stretcher. Physically safe environment: no spills, clutter or unnecessary equipment. Stretcher in lowest position, wheels locked, appropriate side rails in place.

## 2021-03-30 NOTE — ED PROVIDER NOTE - MUSCULOSKELETAL, MLM
Spine appears normal, range of motion is not limited, no muscle or joint tenderness No midline spinal tenderness.

## 2021-03-30 NOTE — CONSULT NOTE ADULT - SUBJECTIVE AND OBJECTIVE BOX
[facility] General Surgery Consultation     Patient is a 50y old  Male who presents with a chief complaint of abdominal pain.    HPI:  50M w/ no pmh s/p right inguinal hernia repair s/p laparoscopic hernia repair for recurrence presenting with abdominal pain. Reports severe RUQ pain, no nausea or vomiting. lasted 1 hour and self resolved without pain medication. States pain is associated with fatty food intake. Had similar pain 3 days ago after eating greasy chicken that self resolved. Denies fevers, chills, SOB. Works as an [employer] .       PAST MEDICAL & SURGICAL HISTORY:  No pertinent past medical history    H/O hernia repair        FAMILY HISTORY:      SOCIAL HISTORY:    MEDICATIONS  (STANDING):    MEDICATIONS  (PRN):    Allergies    No Known Allergies    Intolerances        Vital Signs Last 24 Hrs  T(C): 36.1 (30 Mar 2021 20:09), Max: 36.7 (30 Mar 2021 17:02)  T(F): 97 (30 Mar 2021 20:09), Max: 98.1 (30 Mar 2021 17:02)  HR: 55 (30 Mar 2021 20:09) (55 - 65)  BP: 101/71 (30 Mar 2021 20:09) (96/55 - 107/66)  BP(mean): --  RR: 20 (30 Mar 2021 20:09) (18 - 20)  SpO2: 98% (30 Mar 2021 20:09) (98% - 99%)  Daily Height in cm: 167.64 (30 Mar 2021 14:48)    Daily     General: NAD, well-nourished  HEENT: Atraumatic, EOMI  Resp: Breathing comfortably on RA  CV: Normal sinus rhythm  Abd: soft, ND, nontender  Ext: ROMIx4, motor strength intact x 4                          13.3   11.00 )-----------( 218      ( 30 Mar 2021 17:29 )             39.6     03-30    140  |  105  |  24<H>  ----------------------------<  121<H>  3.8   |  25  |  0.69    Ca    9.2      30 Mar 2021 17:29    TPro  7.5  /  Alb  4.3  /  TBili  0.5  /  DBili  x   /  AST  319<H>  /  ALT  207<H>  /  AlkPhos  126<H>  03-30          Radiographic Findings:       Assessment:         < from: US Abdomen Upper Quadrant Right (03.30.21 @ 18:19) >  IMPRESSION:    Gallstones without focal gallbladder tenderness.  Possible chronic cholecystitis. Suggest HIDA scan as clinically indicated to confirm acute cholecystitis              CECILY FITZPATRICK MD; Resident Radiology  This document has been electronically signed.  ACE FRY MD; Attending Radiologist  This document has been electronically signed. Mar30 2021  8:16PM    < end of copied text >

## 2021-03-30 NOTE — ED PROVIDER NOTE - CPE EDP GASTRO NORM
SUBJECTIVE:  Patient ID: Gayle Guzmán is an [de-identified] y.o. male. HPI: Patient here today for the f/u of chronic problems-- see Problem List and associated comments. New issues or complaints include (also see Assessment for more details): Here for routine checkup. Brought in today by his nephew. No signs or symptoms of Covid during the pandemic. He is now wheelchair confined. He fell a couple times and now he refuses to even try to walk. He has been receiving some physical therapy which he thought was beneficial but he will only do therapy again if he can get the same therapist from the same place. Therapy have been done in his home and he maxed out. He denies any unusual leg pain or sensation of weakness. His wife believes he is just afraid of falling. Denies any chest pain, or respiratory symptoms. He still using and doing well through his trach site. Appetite is fair. Review of Systems   Constitutional: Positive for activity change. Negative for fever and unexpected weight change. HENT: Negative for trouble swallowing. Respiratory: Negative for shortness of breath. Cardiovascular: Negative for chest pain and palpitations. Gastrointestinal: Negative for constipation and diarrhea. Genitourinary: Negative for dysuria. Musculoskeletal: Positive for gait problem (Balance stability issues). Negative for arthralgias and back pain. Skin: Positive for rash. Allergic/Immunologic: Negative for immunocompromised state. Neurological: Negative for dizziness, weakness and light-headedness. Psychiatric/Behavioral: Negative. OBJECTIVE:    /77 (Site: Right Wrist)   Temp 97.7 °F (36.5 °C)   Ht 5' 10\" (1.778 m)   BMI 22.96 kg/m²      Physical Exam  Constitutional:       General: He is not in acute distress. Appearance: He is well-developed. He is not diaphoretic.       Comments: Patient stays in wheelchair   HENT:      Right Ear: External ear normal.      Left Ear: External ear normal.      Mouth/Throat:      Comments: Trach site okay. Old surgical site okay. Eyes:      General: No scleral icterus. Neck:      Vascular: No carotid bruit or JVD. Comments: Renan Carballo site ok  Cardiovascular:      Rate and Rhythm: Normal rate and regular rhythm. Pulses:           Carotid pulses are 2+ on the right side and 2+ on the left side. Radial pulses are 2+ on the right side and 2+ on the left side. Heart sounds: Murmur present. Systolic murmur present with a grade of 2/6. Pulmonary:      Effort: Pulmonary effort is normal. No respiratory distress. Breath sounds: Normal breath sounds. No stridor. No wheezing or rales. Abdominal:      General: Bowel sounds are normal. There is no abdominal bruit. Palpations: Abdomen is soft. Tenderness: There is no abdominal tenderness. Musculoskeletal:      Right lower leg: No edema. Left lower leg: No edema. Lymphadenopathy:      Head:      Right side of head: No submandibular adenopathy. Left side of head: No submandibular adenopathy. Cervical: No cervical adenopathy. Upper Body:      Right upper body: No supraclavicular or epitrochlear adenopathy. Left upper body: No supraclavicular or epitrochlear adenopathy. Skin:     Coloration: Skin is not jaundiced or pale. Neurological:      Mental Status: He is alert and oriented to person, place, and time. Motor: No tremor. Gait: Gait abnormal.      Comments: Leg strength-4 out of 5.  4 out of 5 dorsiflexion both feet. Psychiatric:         Mood and Affect: Mood is not depressed. Behavior: Behavior normal.         Thought Content: Thought content normal.         Judgment: Judgment normal.      Comments: Speech-using voice resonance device         ASSESSMENT:       Encounter Diagnoses   Name Primary?     Routine general medical examination at a health care facility Yes    Preventative health care     Essential hypertension     Chronic - - -

## 2021-03-30 NOTE — ED PROVIDER NOTE - CARE PROVIDER_API CALL
Patel Regan)  Surgery  [address] [facility] [address]  [place] [address]  Phone: [phone]  Fax: [phone]  Follow Up Time: 1-3 Days

## 2021-03-30 NOTE — ED PROVIDER NOTE - PATIENT PORTAL LINK FT
You can access the FollowMyHealth Patient Portal offered by [facility] by registering at the following website: [email or web address]. By joining [employer]’s FollowMyHealth portal, you will also be able to view your health information using other applications (apps) compatible with our system.

## 2021-03-30 NOTE — ED PROVIDER NOTE - OBJECTIVE STATEMENT
50 year old M with pshx of hernia repair ~10 years ago presents to ED c/o waxing and waning RUQ abd pain x3 days, worsening today. Pain worse after meals. Otherwise no modifying factors. Pain does not radiate elsewhere. Urinating and passing stool as normal. Denies nausea, vomiting, hematuria, bloody stools, SOB, testicular swelling, fever, chills. Pt works as an [employer] . Denies tobacco use. Occasionally drinks alcohol.

## 2021-03-30 NOTE — ED PROVIDER NOTE - NSFOLLOWUPINSTRUCTIONS_ED_ALL_ED_FT
1. Follow up with your primary doctor in 1-2 days.     2. Please follow up with surgeon Dr. Patel Regan at [address] [facility] [address] [place] [address] in 1-3 days. Call [phone] to schedule an appointment.     3. Avoid spicy/fatty foods. Take Tylenol 650 mg every 6 hours as needed for pain. Take Motrin 600 mg every 8 hours with food for additional pain relief.    4. Return to the Emergency Department immediately if you develop any new/worsening symptoms including worsening pain, vomiting, inability to eat/drink, fever/chills or any other concerning symptoms. 1. Follow up with your primary doctor in 1-2 days.     2. Please follow up with surgeon Dr. Patel Regan at [address] [facility] [address] [facility] [place] [address] in 1-3 days. Call [phone] to schedule an appointment.     3. Avoid spicy/fatty foods. Your liver function tests were elevated, recommend avoiding Tylenol. Take Motrin 600 mg every 8 hours with food for additional pain relief.    4. Return to the Emergency Department immediately if you develop any new/worsening symptoms including worsening pain, vomiting, inability to eat/drink, fever/chills or any other concerning symptoms.

## 2021-03-30 NOTE — CONSULT NOTE ADULT - ASSESSMENT
50M w/ no pmh s/p right inguinal hernia repair s/p laparoscopic hernia repair for recurrence presenting with biliary colic    - No acute surgical intervention - patient prefers outpatient follow up   - Recommend outpatient follow with Dr. Patel Regan, please call office to schedule appointment  - PO challenge  - Disposition per ED  - Discussed with Dr. HEMA Garcia, PGY3  [facility]  [id number] with any questions   50M w/ no pmh s/p right inguinal hernia repair s/p laparoscopic hernia repair for recurrence presenting with biliary colic    - No acute surgical intervention - patient prefers outpatient follow up   - Recommend outpatient follow with Dr. aPtel Regan, please call office to schedule appointment, [phone]  - PO challenge  - Disposition per ED  - Discussed with Dr. HEMA Garcia, PGY3  Red Surgery  [id number] with any questions

## 2021-03-30 NOTE — ED PROVIDER NOTE - RAPID ASSESSMENT
50 M with PSHx of hernia repair presents with RUQ pain x4 days worsened today one hour after eating two slices of pizza. Denies nausea, or vomiting.     Scribe Statement: I, Netta Bae, attest that this documentation has been prepared under the direction and in the presence of Nahun Ugarte) 50 M with PSHx of hernia repair presents with RUQ pain x4 days worsened today one hour after eating two slices of pizza. Denies nausea, or vomiting.     Scribe Statement: I, Netta Bae, attest that this documentation has been prepared under the direction and in the presence of Nahun Ugarte)  IDr. Ugarte saw patient as a rapid assessment initially via telemedicine encounter, rest of care performed by another attending, and rapid assessment documented by scribe in my presence. Receiving team will follow up on labs, analgesia, any clinical imaging, and perform reassessment and disposition of the patient as clinically indicated. All decisions regarding the progression of care will be made at their discretion.

## 2021-03-30 NOTE — ED PROVIDER NOTE - INGUINAL REGION
Surgical scars noted on the right. No obvious hernia over inguinal area but noted skin discoloration (vitiligo) over surgical scars.

## 2021-05-10 ENCOUNTER — APPOINTMENT (OUTPATIENT)
Dept: SURGERY | Facility: HOSPITAL | Age: 51
End: 2021-05-10

## 2021-05-10 ENCOUNTER — OUTPATIENT (OUTPATIENT)
Dept: OUTPATIENT SERVICES | Facility: HOSPITAL | Age: 51
LOS: 1 days | End: 2021-05-10
Payer: SELF-PAY

## 2021-05-10 VITALS
HEART RATE: 61 BPM | HEIGHT: 67 IN | TEMPERATURE: 96.8 F | BODY MASS INDEX: 24.48 KG/M2 | WEIGHT: 156 LBS | SYSTOLIC BLOOD PRESSURE: 102 MMHG | DIASTOLIC BLOOD PRESSURE: 67 MMHG

## 2021-05-10 DIAGNOSIS — R10.9 UNSPECIFIED ABDOMINAL PAIN: ICD-10-CM

## 2021-05-10 DIAGNOSIS — K80.50 CALCULUS OF BILE DUCT WITHOUT CHOLANGITIS OR CHOLECYSTITIS WITHOUT OBSTRUCTION: ICD-10-CM

## 2021-05-10 DIAGNOSIS — Z98.890 OTHER SPECIFIED POSTPROCEDURAL STATES: Chronic | ICD-10-CM

## 2021-05-10 PROBLEM — Z78.9 OTHER SPECIFIED HEALTH STATUS: Chronic | Status: ACTIVE | Noted: 2021-03-30

## 2021-05-10 PROCEDURE — G0463: CPT

## 2021-05-10 NOTE — PHYSICAL EXAM
[Abdominal Masses] : No abdominal masses [Abdomen Tenderness] : ~T ~M No abdominal tenderness [de-identified] : No acute distress [de-identified] : Normal respiratory effort [de-identified] : Soft, nondistended

## 2021-05-10 NOTE — PLAN
[FreeTextEntry1] : \par - Book for laparoscopic cholecystectomy\par - Patient informed that he can cancel surgery if he so chooses\par - Will require pre-op testing if surgery is the plan

## 2021-05-10 NOTE — REVIEW OF SYSTEMS
[Negative] : Musculoskeletal [Fever] : no fever [Chills] : no chills [Feeling Poorly] : not feeling poorly [Feeling Tired] : not feeling tired [Chest Pain] : no chest pain [Wheezing] : no wheezing [Abdominal Pain] : no abdominal pain [Vomiting] : no vomiting

## 2021-05-10 NOTE — HISTORY OF PRESENT ILLNESS
[de-identified] : Mr. Garrett presented to [facility] ED on 3/30/2021 with abdomial pain. Ultrasound at that time revealed cholelithiasis with possible chronic cholecystitis. Patient was seen by Dr. Regan and deemed to be stable of discharge with outpatient follow up for surgical planning. Since that time, he has not had any more abdominal pain or fevers, weight loss, or N/V. He has been managing his previous symptoms with lifestyle and diet changes.

## 2021-05-10 NOTE — ASSESSMENT
[FreeTextEntry1] : Mr. Garrett is a 50M with history of biliary colic presenting post-ED visit to discuss surgical planning. Patient is not entirely sure about whether he wants to pursue surgical intervention versus continued dietary modification.

## 2021-06-03 ENCOUNTER — LABORATORY RESULT (OUTPATIENT)
Age: 51
End: 2021-06-03

## 2021-06-03 ENCOUNTER — OUTPATIENT (OUTPATIENT)
Dept: OUTPATIENT SERVICES | Facility: HOSPITAL | Age: 51
LOS: 1 days | End: 2021-06-03
Payer: SELF-PAY

## 2021-06-03 ENCOUNTER — APPOINTMENT (OUTPATIENT)
Dept: INTERNAL MEDICINE | Facility: CLINIC | Age: 51
End: 2021-06-03

## 2021-06-03 ENCOUNTER — NON-APPOINTMENT (OUTPATIENT)
Age: 51
End: 2021-06-03

## 2021-06-03 VITALS
WEIGHT: 158 LBS | HEIGHT: 67 IN | BODY MASS INDEX: 24.8 KG/M2 | HEART RATE: 58 BPM | SYSTOLIC BLOOD PRESSURE: 98 MMHG | OXYGEN SATURATION: 96 % | DIASTOLIC BLOOD PRESSURE: 60 MMHG

## 2021-06-03 DIAGNOSIS — R42 DIZZINESS AND GIDDINESS: ICD-10-CM

## 2021-06-03 DIAGNOSIS — I10 ESSENTIAL (PRIMARY) HYPERTENSION: ICD-10-CM

## 2021-06-03 DIAGNOSIS — Z98.890 OTHER SPECIFIED POSTPROCEDURAL STATES: Chronic | ICD-10-CM

## 2021-06-03 PROCEDURE — 80061 LIPID PANEL: CPT

## 2021-06-03 PROCEDURE — 83550 IRON BINDING TEST: CPT

## 2021-06-03 PROCEDURE — 83540 ASSAY OF IRON: CPT

## 2021-06-03 PROCEDURE — 85027 COMPLETE CBC AUTOMATED: CPT

## 2021-06-03 PROCEDURE — 80053 COMPREHEN METABOLIC PANEL: CPT

## 2021-06-03 PROCEDURE — 82977 ASSAY OF GGT: CPT

## 2021-06-03 PROCEDURE — 84443 ASSAY THYROID STIM HORMONE: CPT

## 2021-06-03 PROCEDURE — G0463: CPT

## 2021-06-03 PROCEDURE — 82728 ASSAY OF FERRITIN: CPT

## 2021-06-03 NOTE — PHYSICAL EXAM
[No Acute Distress] : no acute distress [Well Nourished] : well nourished [Well Developed] : well developed [Well-Appearing] : well-appearing [Normal Sclera/Conjunctiva] : normal sclera/conjunctiva [PERRL] : pupils equal round and reactive to light [EOMI] : extraocular movements intact [Normal Outer Ear/Nose] : the outer ears and nose were normal in appearance [Normal Oropharynx] : the oropharynx was normal [No JVD] : no jugular venous distention [No Lymphadenopathy] : no lymphadenopathy [Supple] : supple [Thyroid Normal, No Nodules] : the thyroid was normal and there were no nodules present [No Respiratory Distress] : no respiratory distress  [No Accessory Muscle Use] : no accessory muscle use [Clear to Auscultation] : lungs were clear to auscultation bilaterally [Normal Rate] : normal rate  [Regular Rhythm] : with a regular rhythm [No Murmur] : no murmur heard [Normal S1, S2] : normal S1 and S2 [No Carotid Bruits] : no carotid bruits [No Abdominal Bruit] : a ~M bruit was not heard ~T in the abdomen [No Varicosities] : no varicosities [Pedal Pulses Present] : the pedal pulses are present [No Edema] : there was no peripheral edema [No Palpable Aorta] : no palpable aorta [No Extremity Clubbing/Cyanosis] : no extremity clubbing/cyanosis [Soft] : abdomen soft [Non Tender] : non-tender [Non-distended] : non-distended [No Masses] : no abdominal mass palpated [No HSM] : no HSM [Normal Bowel Sounds] : normal bowel sounds [Normal Posterior Cervical Nodes] : no posterior cervical lymphadenopathy [Normal Anterior Cervical Nodes] : no anterior cervical lymphadenopathy [No CVA Tenderness] : no CVA  tenderness [No Spinal Tenderness] : no spinal tenderness [No Joint Swelling] : no joint swelling [Grossly Normal Strength/Tone] : grossly normal strength/tone [No Rash] : no rash [Coordination Grossly Intact] : coordination grossly intact [No Focal Deficits] : no focal deficits [Normal Gait] : normal gait [Deep Tendon Reflexes (DTR)] : deep tendon reflexes were 2+ and symmetric [Normal Affect] : the affect was normal [Normal Insight/Judgement] : insight and judgment were intact

## 2021-06-04 LAB
ALBUMIN SERPL ELPH-MCNC: 4.6 G/DL — SIGNIFICANT CHANGE UP (ref 3.3–5)
ALP SERPL-CCNC: 72 U/L — SIGNIFICANT CHANGE UP (ref 40–120)
ALT FLD-CCNC: 24 U/L — SIGNIFICANT CHANGE UP (ref 10–45)
ANION GAP SERPL CALC-SCNC: 15 MMOL/L — SIGNIFICANT CHANGE UP (ref 5–17)
AST SERPL-CCNC: 22 U/L — SIGNIFICANT CHANGE UP (ref 10–40)
BILIRUB SERPL-MCNC: 0.3 MG/DL — SIGNIFICANT CHANGE UP (ref 0.2–1.2)
BUN SERPL-MCNC: 17 MG/DL — SIGNIFICANT CHANGE UP (ref 7–23)
CALCIUM SERPL-MCNC: 9.6 MG/DL — SIGNIFICANT CHANGE UP (ref 8.4–10.5)
CHLORIDE SERPL-SCNC: 100 MMOL/L — SIGNIFICANT CHANGE UP (ref 96–108)
CHOLEST SERPL-MCNC: 181 MG/DL — SIGNIFICANT CHANGE UP
CO2 SERPL-SCNC: 24 MMOL/L — SIGNIFICANT CHANGE UP (ref 22–31)
CREAT SERPL-MCNC: 0.69 MG/DL — SIGNIFICANT CHANGE UP (ref 0.5–1.3)
FERRITIN SERPL-MCNC: 60 NG/ML — SIGNIFICANT CHANGE UP (ref 30–400)
GGT SERPL-CCNC: 32 U/L — SIGNIFICANT CHANGE UP (ref 9–50)
GLUCOSE SERPL-MCNC: 60 MG/DL — LOW (ref 70–99)
HCT VFR BLD CALC: 41 % — SIGNIFICANT CHANGE UP (ref 39–50)
HDLC SERPL-MCNC: 66 MG/DL — SIGNIFICANT CHANGE UP
HGB BLD-MCNC: 13.9 G/DL — SIGNIFICANT CHANGE UP (ref 13–17)
IRON SATN MFR SERPL: 19 % — SIGNIFICANT CHANGE UP (ref 16–55)
IRON SATN MFR SERPL: 66 UG/DL — SIGNIFICANT CHANGE UP (ref 45–165)
LIPID PNL WITH DIRECT LDL SERPL: 99 MG/DL — SIGNIFICANT CHANGE UP
MCHC RBC-ENTMCNC: 30.3 PG — SIGNIFICANT CHANGE UP (ref 27–34)
MCHC RBC-ENTMCNC: 33.9 GM/DL — SIGNIFICANT CHANGE UP (ref 32–36)
MCV RBC AUTO: 89.3 FL — SIGNIFICANT CHANGE UP (ref 80–100)
NON HDL CHOLESTEROL: 115 MG/DL — SIGNIFICANT CHANGE UP
PLATELET # BLD AUTO: 232 K/UL — SIGNIFICANT CHANGE UP (ref 150–400)
POTASSIUM SERPL-MCNC: 4.4 MMOL/L — SIGNIFICANT CHANGE UP (ref 3.5–5.3)
POTASSIUM SERPL-SCNC: 4.4 MMOL/L — SIGNIFICANT CHANGE UP (ref 3.5–5.3)
PROT SERPL-MCNC: 7.7 G/DL — SIGNIFICANT CHANGE UP (ref 6–8.3)
RBC # BLD: 4.59 M/UL — SIGNIFICANT CHANGE UP (ref 4.2–5.8)
RBC # FLD: 12.7 % — SIGNIFICANT CHANGE UP (ref 10.3–14.5)
SODIUM SERPL-SCNC: 139 MMOL/L — SIGNIFICANT CHANGE UP (ref 135–145)
T4 FREE+ TSH PNL SERPL: 1.78 UIU/ML — SIGNIFICANT CHANGE UP (ref 0.27–4.2)
TIBC SERPL-MCNC: 344 UG/DL — SIGNIFICANT CHANGE UP (ref 220–430)
TRIGL SERPL-MCNC: 80 MG/DL — SIGNIFICANT CHANGE UP
UIBC SERPL-MCNC: 279 UG/DL — SIGNIFICANT CHANGE UP (ref 110–370)
WBC # BLD: 5.34 K/UL — SIGNIFICANT CHANGE UP (ref 3.8–10.5)
WBC # FLD AUTO: 5.34 K/UL — SIGNIFICANT CHANGE UP (ref 3.8–10.5)

## 2021-06-07 ENCOUNTER — NON-APPOINTMENT (OUTPATIENT)
Age: 51
End: 2021-06-07

## 2021-06-08 ENCOUNTER — NON-APPOINTMENT (OUTPATIENT)
Age: 51
End: 2021-06-08

## 2021-06-10 NOTE — PLAN
[FreeTextEntry1] : #dizziness and possible pre syncope \par at this time likely vagal event from prolonged standing however concerned with relatively low BP \par patient with no appreciable murmurs, no palpitations, non exertional in nature, non reproducible\par EKG with sinus hayde and RBBB\par cards consult for possible symptomatic bradycardia possible stress testing although only risk factors are age and gender\par if cannot obtain in the next 5 weeks then will order ECHO and have patient follow up with us\par advised to go to the ED if sxs reoccur\par neuro exam performed without focal neurological deficit especially of the cerebellum\par adore hallpike negative \par orthostatics negative\par POC Glucose within normal range\par afebrile with no focal signs of infxn\par no signs of bleeding\par encouraged patient to drink more water and increase salt intake to temporarily control symptoms in the meantime\par CBC CMP TSH A1c, Lipids, GGT, Ferritin and Iron\par \par Everette Medina PGY2\par

## 2021-06-10 NOTE — HISTORY OF PRESENT ILLNESS
[FreeTextEntry8] : [place] PI [id number]\par 50 year old Male with history of vitiligo, hemmorrhoids 2/2 chronic constipation, chronic biliary colic controlled with dietary modifications p/f 3 episodes of dizziness. 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 Of note, patient was not chaning rapidly in position, was not coughing or using the bathroom, did not have chest pain or shortness of breath or nausea, diaphoresis, palpitations, no syncope, fall, blacking out, shaking, (had eaten that morning ). Patient has been remaining healthy and adjusting his diet to control his biliary colic wihout the surgery, has been avoiding salt, urineates yellow, no blood in his tstool, urine or any melena. Of note no cardiac or neurologic history and not taking any medications or supplements.

## 2021-06-10 NOTE — ASSESSMENT
[FreeTextEntry1] : 50 year old Male with history of vitiligo, hemmorrhoids 2/2 chronic constipation, chronic biliary colic controlled with dietary modifications p/f 3 episodes of dizziness

## 2021-06-10 NOTE — END OF VISIT
[] : Resident [FreeTextEntry3] : 50M w/ brief episode of "dizzy-ness" after prolonged standing, RBBB on EKG - no priors. Will send to cards, to visit within 2 weeks or RTO.

## 2021-06-15 ENCOUNTER — FORM ENCOUNTER (OUTPATIENT)
Age: 51
End: 2021-06-15

## 2021-06-16 ENCOUNTER — OUTPATIENT (OUTPATIENT)
Dept: OUTPATIENT SERVICES | Facility: HOSPITAL | Age: 51
LOS: 1 days | End: 2021-06-16
Payer: SELF-PAY

## 2021-06-16 ENCOUNTER — APPOINTMENT (OUTPATIENT)
Dept: CARDIOLOGY | Facility: HOSPITAL | Age: 51
End: 2021-06-16

## 2021-06-16 VITALS
HEIGHT: 67 IN | DIASTOLIC BLOOD PRESSURE: 71 MMHG | BODY MASS INDEX: 25.11 KG/M2 | OXYGEN SATURATION: 99 % | SYSTOLIC BLOOD PRESSURE: 114 MMHG | HEART RATE: 59 BPM | WEIGHT: 160 LBS

## 2021-06-16 DIAGNOSIS — I25.10 ATHEROSCLEROTIC HEART DISEASE OF NATIVE CORONARY ARTERY WITHOUT ANGINA PECTORIS: ICD-10-CM

## 2021-06-16 DIAGNOSIS — Z98.890 OTHER SPECIFIED POSTPROCEDURAL STATES: Chronic | ICD-10-CM

## 2021-06-16 DIAGNOSIS — H53.8 OTHER VISUAL DISTURBANCES: ICD-10-CM

## 2021-06-16 PROCEDURE — 93005 ELECTROCARDIOGRAM TRACING: CPT

## 2021-06-16 PROCEDURE — G0463: CPT

## 2021-06-16 NOTE — PHYSICAL EXAM
[Well Developed] : well developed [Well Nourished] : well nourished [No Acute Distress] : no acute distress [Normal Conjunctiva] : normal conjunctiva [Normal Venous Pressure] : normal venous pressure [No Carotid Bruit] : no carotid bruit [Normal S1, S2] : normal S1, S2 [No Murmur] : no murmur [No Rub] : no rub [No Gallop] : no gallop [Clear Lung Fields] : clear lung fields [Good Air Entry] : good air entry [No Respiratory Distress] : no respiratory distress  [Soft] : abdomen soft [Non Tender] : non-tender [No Masses/organomegaly] : no masses/organomegaly [Normal Gait] : normal gait [No Edema] : no edema [No Cyanosis] : no cyanosis [No Rash] : no rash [Moves all extremities] : moves all extremities [No Focal Deficits] : no focal deficits [Normal Speech] : normal speech [Alert and Oriented] : alert and oriented

## 2021-06-17 DIAGNOSIS — R42 DIZZINESS AND GIDDINESS: ICD-10-CM

## 2021-06-20 NOTE — HISTORY OF PRESENT ILLNESS
[FreeTextEntry1] : : [id number] (Thai)\par \par 50 year old Male with history of vitiligo, hemorrhoids 2/2 chronic constipation, chronic biliary colic controlled with dietary modifications p/f 3 episodes of dizziness. 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 Episodes assoc w/ diaphoresis; he denies LOC, palpitations, CP, dyspnea, presyncope, syncope, vertigo. No identifiable trigger observed and patient denies further episodes. Of note, pt w/ no known cardiac hx, prior stress tests, or angiogram. Patient is physically active, playing soccer 1-2x weekly for 15 hours. Nonsmoker.

## 2021-06-20 NOTE — REVIEW OF SYSTEMS
[Negative] : Heme/Lymph [Dizziness] : no dizziness [Tremor] : no tremor was seen [Numbness (Hypoesthesia)] : no numbness [Convulsions] : no convulsions [Tingling (Paresthesia)] : no tingling [Weakness] : no weakness [Speech Disturbance] : no speech disturbance [de-identified] : visual disturbance

## 2021-06-22 DIAGNOSIS — H53.8 OTHER VISUAL DISTURBANCES: ICD-10-CM

## 2021-07-19 ENCOUNTER — APPOINTMENT (OUTPATIENT)
Dept: CV DIAGNOSITCS | Facility: HOSPITAL | Age: 51
End: 2021-07-19

## 2021-07-19 ENCOUNTER — OUTPATIENT (OUTPATIENT)
Dept: OUTPATIENT SERVICES | Facility: HOSPITAL | Age: 51
LOS: 1 days | End: 2021-07-19
Payer: SELF-PAY

## 2021-07-19 DIAGNOSIS — H53.8 OTHER VISUAL DISTURBANCES: ICD-10-CM

## 2021-07-19 DIAGNOSIS — Z98.890 OTHER SPECIFIED POSTPROCEDURAL STATES: Chronic | ICD-10-CM

## 2021-07-19 DIAGNOSIS — I25.10 ATHEROSCLEROTIC HEART DISEASE OF NATIVE CORONARY ARTERY WITHOUT ANGINA PECTORIS: ICD-10-CM

## 2021-07-19 PROCEDURE — 93306 TTE W/DOPPLER COMPLETE: CPT

## 2021-07-19 PROCEDURE — 93306 TTE W/DOPPLER COMPLETE: CPT | Mod: 26

## 2021-08-31 ENCOUNTER — APPOINTMENT (OUTPATIENT)
Dept: INTERNAL MEDICINE | Facility: CLINIC | Age: 51
End: 2021-08-31

## 2021-09-21 ENCOUNTER — APPOINTMENT (OUTPATIENT)
Dept: INTERNAL MEDICINE | Facility: CLINIC | Age: 51
End: 2021-09-21

## 2021-10-14 ENCOUNTER — OUTPATIENT (OUTPATIENT)
Dept: OUTPATIENT SERVICES | Facility: HOSPITAL | Age: 51
LOS: 1 days | End: 2021-10-14
Payer: SELF-PAY

## 2021-10-14 ENCOUNTER — APPOINTMENT (OUTPATIENT)
Dept: INTERNAL MEDICINE | Facility: CLINIC | Age: 51
End: 2021-10-14

## 2021-10-14 VITALS
OXYGEN SATURATION: 96 % | WEIGHT: 166 LBS | SYSTOLIC BLOOD PRESSURE: 110 MMHG | DIASTOLIC BLOOD PRESSURE: 62 MMHG | HEIGHT: 67 IN | BODY MASS INDEX: 26.06 KG/M2 | HEART RATE: 53 BPM

## 2021-10-14 DIAGNOSIS — I10 ESSENTIAL (PRIMARY) HYPERTENSION: ICD-10-CM

## 2021-10-14 DIAGNOSIS — Z23 ENCOUNTER FOR IMMUNIZATION: ICD-10-CM

## 2021-10-14 DIAGNOSIS — I47.1 SUPRAVENTRICULAR TACHYCARDIA: ICD-10-CM

## 2021-10-14 DIAGNOSIS — M25.511 PAIN IN RIGHT SHOULDER: ICD-10-CM

## 2021-10-14 DIAGNOSIS — K80.50 CALCULUS OF BILE DUCT W/OUT CHOLANGITIS OR CHOLECYSTITIS W/OUT OBSTRUCTION: ICD-10-CM

## 2021-10-14 DIAGNOSIS — Z98.890 OTHER SPECIFIED POSTPROCEDURAL STATES: Chronic | ICD-10-CM

## 2021-10-14 PROCEDURE — G0008: CPT

## 2021-10-14 PROCEDURE — 90688 IIV4 VACCINE SPLT 0.5 ML IM: CPT

## 2021-10-14 PROCEDURE — G0463: CPT | Mod: 25

## 2021-10-14 RX ORDER — DICLOFENAC SODIUM 1% 10 MG/G
1 GEL TOPICAL
Qty: 1 | Refills: 3 | Status: ACTIVE | COMMUNITY
Start: 2021-10-14 | End: 1900-01-01

## 2021-10-14 NOTE — PHYSICAL EXAM
[No Acute Distress] : no acute distress [Well Nourished] : well nourished [Well Developed] : well developed [Well-Appearing] : well-appearing [Normal Sclera/Conjunctiva] : normal sclera/conjunctiva [PERRL] : pupils equal round and reactive to light [EOMI] : extraocular movements intact [Normal Outer Ear/Nose] : the outer ears and nose were normal in appearance [Normal Oropharynx] : the oropharynx was normal [No JVD] : no jugular venous distention [No Lymphadenopathy] : no lymphadenopathy [Supple] : supple [Thyroid Normal, No Nodules] : the thyroid was normal and there were no nodules present [No Respiratory Distress] : no respiratory distress  [No Accessory Muscle Use] : no accessory muscle use [Clear to Auscultation] : lungs were clear to auscultation bilaterally [Normal Rate] : normal rate  [Regular Rhythm] : with a regular rhythm [Normal S1, S2] : normal S1 and S2 [No Murmur] : no murmur heard [No Carotid Bruits] : no carotid bruits [No Abdominal Bruit] : a ~M bruit was not heard ~T in the abdomen [No Varicosities] : no varicosities [Pedal Pulses Present] : the pedal pulses are present [No Edema] : there was no peripheral edema [No Palpable Aorta] : no palpable aorta [No Extremity Clubbing/Cyanosis] : no extremity clubbing/cyanosis [Soft] : abdomen soft [Non Tender] : non-tender [Non-distended] : non-distended [No Masses] : no abdominal mass palpated [No HSM] : no HSM [Normal Bowel Sounds] : normal bowel sounds [No CVA Tenderness] : no CVA  tenderness [No Spinal Tenderness] : no spinal tenderness [No Joint Swelling] : no joint swelling [Grossly Normal Strength/Tone] : grossly normal strength/tone [No Rash] : no rash [Coordination Grossly Intact] : coordination grossly intact [No Focal Deficits] : no focal deficits [Normal Gait] : normal gait [Deep Tendon Reflexes (DTR)] : deep tendon reflexes were 2+ and symmetric [Normal Affect] : the affect was normal [Normal Insight/Judgement] : insight and judgment were intact

## 2021-10-15 NOTE — ASSESSMENT
[FreeTextEntry1] : 50 year old Male with history of vitiligo, hemmorrhoids 2/2 chronic constipation, chronic biliary colic controlled with dietary modifications p/f 3 episodes of dizziness Area L Indication Text: Tumors in this location are included in Area L (trunk and extremities).  Mohs surgery is indicated for larger tumors, or tumors with aggressive histologic features, in these anatomic locations.

## 2021-10-15 NOTE — PLAN
[FreeTextEntry1] : 50 year old Male with history of vitiligo, hemmorrhoids 2/2 chronic constipation, chronic biliary colic presents for right shoulder pain\par \par ##right shoulder pain\par -likely musculoskeletal\par -PT referral, diclofenac gel\par \par ##SVT/bradycardia\par -patient currently asymptomatic with normal ECHO\par -instructed patient to visit cardiologist\par \par ##biliary colic\par -surgery referal in case patient interseted in goign through [facility]\par \par ##HCM\par -flu shot given this session\par \par RTC 5 weeks for CPE\par \par case d/w Dr Daigle\par \par Ryan Wolf MD, PGY-3\par Internal Medicine\par FILIBERTOJ

## 2021-10-15 NOTE — HISTORY OF PRESENT ILLNESS
[FreeTextEntry8] : Korean PI [id number]\par 50 year old Male with history of vitiligo, hemmorrhoids 2/2 chronic constipation, chronic biliary colic controlled with dietary modifications p/f 3 episodes of dizziness. 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 Of note, patient was not chaning rapidly in position, was not coughing or using the bathroom, did not have chest pain or shortness of breath or nausea, diaphoresis, palpitations, no syncope, fall, blacking out, shaking, (had eaten that morning ). Patient has been remaining healthy and adjusting his diet to control his biliary colic wihout the surgery, has been avoiding salt, urineates yellow, no blood in his tstool, urine or any melena. Of note no cardiac or neurologic history and not taking any medications or supplements.  [FreeTextEntry1] : Follow Up [de-identified] : 50 year old Male with history of vitiligo, hemmorrhoids 2/2 chronic constipation, chronic biliary colic controlled with dietary modifications presents for a physical with no acute complaints. Patient however scheduled for 30 minute visit. Discussed with patient only being able to address acute complaints and to reschedule CPE in 5 weeks. Patient accepts plan. \par \par Patient's only acute complaint is right shoulder pain. Chronic, made worse with activity. Has only tried topical creams, but have not helped significantly. States he fell on his back years ago, and has had residual occasional pain in that area. \par \par Of note, patient was worked up for dizziness and saw cardiology who recommended ECHO and holter monitor. ECHO unremarkable, however holter monitor noted HR as low as 39, with episode of SVT lasting 1 minute. Patient currently asymptomatic \par \par

## 2021-10-15 NOTE — END OF VISIT
[] : Resident [FreeTextEntry3] : 50yo with PMhx of abnormal holter, biliary colic presents with a variety of complaints. Note dizziness complaint has resolved. Exam reassuring per resident. Agree pt NEEDS to see cardiology given concerning holter monitor but reassuring that he is no longer having sx.

## 2021-10-18 DIAGNOSIS — K80.50 CALCULUS OF BILE DUCT WITHOUT CHOLANGITIS OR CHOLECYSTITIS WITHOUT OBSTRUCTION: ICD-10-CM

## 2021-10-18 DIAGNOSIS — M25.511 PAIN IN RIGHT SHOULDER: ICD-10-CM

## 2021-10-18 DIAGNOSIS — Z23 ENCOUNTER FOR IMMUNIZATION: ICD-10-CM

## 2021-10-18 DIAGNOSIS — I47.1 SUPRAVENTRICULAR TACHYCARDIA: ICD-10-CM

## 2021-11-19 ENCOUNTER — APPOINTMENT (OUTPATIENT)
Dept: INTERNAL MEDICINE | Facility: CLINIC | Age: 51
End: 2021-11-19

## 2022-01-05 ENCOUNTER — OUTPATIENT (OUTPATIENT)
Dept: OUTPATIENT SERVICES | Facility: HOSPITAL | Age: 52
LOS: 1 days | End: 2022-01-05
Payer: SELF-PAY

## 2022-01-05 ENCOUNTER — APPOINTMENT (OUTPATIENT)
Dept: CARDIOLOGY | Facility: HOSPITAL | Age: 52
End: 2022-01-05

## 2022-01-05 VITALS
DIASTOLIC BLOOD PRESSURE: 69 MMHG | HEART RATE: 54 BPM | SYSTOLIC BLOOD PRESSURE: 107 MMHG | WEIGHT: 174 LBS | BODY MASS INDEX: 27.31 KG/M2 | OXYGEN SATURATION: 95 % | HEIGHT: 67 IN

## 2022-01-05 DIAGNOSIS — Z98.890 OTHER SPECIFIED POSTPROCEDURAL STATES: Chronic | ICD-10-CM

## 2022-01-05 DIAGNOSIS — I25.10 ATHEROSCLEROTIC HEART DISEASE OF NATIVE CORONARY ARTERY WITHOUT ANGINA PECTORIS: ICD-10-CM

## 2022-01-05 DIAGNOSIS — R00.1 BRADYCARDIA, UNSPECIFIED: ICD-10-CM

## 2022-01-05 PROCEDURE — 93005 ELECTROCARDIOGRAM TRACING: CPT

## 2022-01-05 PROCEDURE — G0463: CPT

## 2022-01-06 DIAGNOSIS — R00.1 BRADYCARDIA, UNSPECIFIED: ICD-10-CM

## 2022-01-10 NOTE — PHYSICAL EXAM
[Well Developed] : well developed [Well Nourished] : well nourished [No Acute Distress] : no acute distress [Normal Conjunctiva] : normal conjunctiva [Normal Venous Pressure] : normal venous pressure [No Carotid Bruit] : no carotid bruit [Normal S1, S2] : normal S1, S2 [No Murmur] : no murmur [No Rub] : no rub [No Gallop] : no gallop [Clear Lung Fields] : clear lung fields [Good Air Entry] : good air entry [No Respiratory Distress] : no respiratory distress  [Soft] : abdomen soft [Non Tender] : non-tender [No Masses/organomegaly] : no masses/organomegaly [Normal Bowel Sounds] : normal bowel sounds [Normal Gait] : normal gait [No Edema] : no edema [No Cyanosis] : no cyanosis [No Clubbing] : no clubbing [No Varicosities] : no varicosities [No Rash] : no rash [No Skin Lesions] : no skin lesions [Moves all extremities] : moves all extremities [No Focal Deficits] : no focal deficits [Normal Speech] : normal speech [Alert and Oriented] : alert and oriented [de-identified] : Bradycardia

## 2022-01-10 NOTE — REASON FOR VISIT
[FreeTextEntry1] : 50 year old Male with history of vitiligo, hemorrhoids 2/2 chronic constipation, chronic biliary colic controlled with dietary modifications presents for f/u. Asymptomatic since last visit Denies LOC, presyncope/syncope, palpitations, CP, dyspnea, vertigo. Patient is physically active, playing soccer 1-2x weekly for 15 hours. Nonsmoker. \par \par TTE 7/19/2021\par EF 67%\par Normal study\par \par Holter 7/13/2021\par Predominant rhythm SR\par Occasional bradycardia (min 38 bpm)\par 1 run of  bpm lasting 1min 4sec

## 2022-03-09 ENCOUNTER — NON-APPOINTMENT (OUTPATIENT)
Age: 52
End: 2022-03-09

## 2022-03-20 ENCOUNTER — INPATIENT (INPATIENT)
Facility: HOSPITAL | Age: 52
LOS: 4 days | Discharge: HOME | DRG: 418 | End: 2022-03-25
Attending: SURGERY | Admitting: SURGERY
Payer: MEDICAID

## 2022-03-20 VITALS
DIASTOLIC BLOOD PRESSURE: 74 MMHG | RESPIRATION RATE: 16 BRPM | TEMPERATURE: 98 F | HEIGHT: 66 IN | WEIGHT: 169.98 LBS | OXYGEN SATURATION: 95 % | HEART RATE: 60 BPM | SYSTOLIC BLOOD PRESSURE: 117 MMHG

## 2022-03-20 DIAGNOSIS — Z98.890 OTHER SPECIFIED POSTPROCEDURAL STATES: Chronic | ICD-10-CM

## 2022-03-20 PROCEDURE — 99285 EMERGENCY DEPT VISIT HI MDM: CPT

## 2022-03-20 RX ORDER — SODIUM CHLORIDE 9 MG/ML
1000 INJECTION INTRAMUSCULAR; INTRAVENOUS; SUBCUTANEOUS ONCE
Refills: 0 | Status: COMPLETED | OUTPATIENT
Start: 2022-03-20 | End: 2022-03-20

## 2022-03-20 RX ORDER — FAMOTIDINE 10 MG/ML
20 INJECTION INTRAVENOUS ONCE
Refills: 0 | Status: COMPLETED | OUTPATIENT
Start: 2022-03-20 | End: 2022-03-20

## 2022-03-20 RX ORDER — ACETAMINOPHEN 500 MG
975 TABLET ORAL ONCE
Refills: 0 | Status: COMPLETED | OUTPATIENT
Start: 2022-03-20 | End: 2022-03-20

## 2022-03-20 RX ORDER — MORPHINE SULFATE 50 MG/1
4 CAPSULE, EXTENDED RELEASE ORAL ONCE
Refills: 0 | Status: DISCONTINUED | OUTPATIENT
Start: 2022-03-20 | End: 2022-03-20

## 2022-03-21 DIAGNOSIS — K81.0 ACUTE CHOLECYSTITIS: ICD-10-CM

## 2022-03-21 LAB
ALBUMIN SERPL ELPH-MCNC: 4.2 G/DL — SIGNIFICANT CHANGE UP (ref 3.3–5)
ALBUMIN SERPL ELPH-MCNC: 4.6 G/DL — SIGNIFICANT CHANGE UP (ref 3.3–5)
ALP SERPL-CCNC: 71 U/L — SIGNIFICANT CHANGE UP (ref 40–120)
ALP SERPL-CCNC: 83 U/L — SIGNIFICANT CHANGE UP (ref 40–120)
ALT FLD-CCNC: 31 U/L — SIGNIFICANT CHANGE UP (ref 10–45)
ALT FLD-CCNC: 84 U/L — HIGH (ref 10–45)
ANION GAP SERPL CALC-SCNC: 10 MMOL/L — SIGNIFICANT CHANGE UP (ref 5–17)
ANION GAP SERPL CALC-SCNC: 12 MMOL/L — SIGNIFICANT CHANGE UP (ref 5–17)
APPEARANCE UR: CLEAR — SIGNIFICANT CHANGE UP
APTT BLD: 38.7 SEC — HIGH (ref 27.5–35.5)
AST SERPL-CCNC: 19 U/L — SIGNIFICANT CHANGE UP (ref 10–40)
AST SERPL-CCNC: 98 U/L — HIGH (ref 10–40)
BASE EXCESS BLDV CALC-SCNC: 1.7 MMOL/L — SIGNIFICANT CHANGE UP (ref -2–2)
BASOPHILS # BLD AUTO: 0.02 K/UL — SIGNIFICANT CHANGE UP (ref 0–0.2)
BASOPHILS # BLD AUTO: 0.03 K/UL — SIGNIFICANT CHANGE UP (ref 0–0.2)
BASOPHILS NFR BLD AUTO: 0.3 % — SIGNIFICANT CHANGE UP (ref 0–2)
BASOPHILS NFR BLD AUTO: 0.3 % — SIGNIFICANT CHANGE UP (ref 0–2)
BILIRUB DIRECT SERPL-MCNC: 0.2 MG/DL — SIGNIFICANT CHANGE UP (ref 0–0.3)
BILIRUB INDIRECT FLD-MCNC: 0.6 MG/DL — SIGNIFICANT CHANGE UP (ref 0.2–1)
BILIRUB SERPL-MCNC: 0.4 MG/DL — SIGNIFICANT CHANGE UP (ref 0.2–1.2)
BILIRUB SERPL-MCNC: 0.8 MG/DL — SIGNIFICANT CHANGE UP (ref 0.2–1.2)
BILIRUB UR-MCNC: NEGATIVE — SIGNIFICANT CHANGE UP
BLD GP AB SCN SERPL QL: NEGATIVE — SIGNIFICANT CHANGE UP
BLOOD GAS VENOUS - CREATININE: SIGNIFICANT CHANGE UP MG/DL (ref 0.5–1.3)
BUN SERPL-MCNC: 17 MG/DL — SIGNIFICANT CHANGE UP (ref 7–23)
BUN SERPL-MCNC: 19 MG/DL — SIGNIFICANT CHANGE UP (ref 7–23)
CA-I SERPL-SCNC: 1.15 MMOL/L — SIGNIFICANT CHANGE UP (ref 1.15–1.33)
CALCIUM SERPL-MCNC: 8.4 MG/DL — SIGNIFICANT CHANGE UP (ref 8.4–10.5)
CALCIUM SERPL-MCNC: 8.9 MG/DL — SIGNIFICANT CHANGE UP (ref 8.4–10.5)
CHLORIDE BLDV-SCNC: 100 MMOL/L — SIGNIFICANT CHANGE UP (ref 96–108)
CHLORIDE SERPL-SCNC: 101 MMOL/L — SIGNIFICANT CHANGE UP (ref 96–108)
CHLORIDE SERPL-SCNC: 101 MMOL/L — SIGNIFICANT CHANGE UP (ref 96–108)
CO2 BLDV-SCNC: 28 MMOL/L — HIGH (ref 22–26)
CO2 SERPL-SCNC: 24 MMOL/L — SIGNIFICANT CHANGE UP (ref 22–31)
CO2 SERPL-SCNC: 24 MMOL/L — SIGNIFICANT CHANGE UP (ref 22–31)
COLOR SPEC: YELLOW — SIGNIFICANT CHANGE UP
CREAT SERPL-MCNC: 0.65 MG/DL — SIGNIFICANT CHANGE UP (ref 0.5–1.3)
CREAT SERPL-MCNC: 0.72 MG/DL — SIGNIFICANT CHANGE UP (ref 0.5–1.3)
DIFF PNL FLD: NEGATIVE — SIGNIFICANT CHANGE UP
EGFR: 110 ML/MIN/1.73M2 — SIGNIFICANT CHANGE UP
EGFR: 113 ML/MIN/1.73M2 — SIGNIFICANT CHANGE UP
EOSINOPHIL # BLD AUTO: 0 K/UL — SIGNIFICANT CHANGE UP (ref 0–0.5)
EOSINOPHIL # BLD AUTO: 0.02 K/UL — SIGNIFICANT CHANGE UP (ref 0–0.5)
EOSINOPHIL NFR BLD AUTO: 0 % — SIGNIFICANT CHANGE UP (ref 0–6)
EOSINOPHIL NFR BLD AUTO: 0.3 % — SIGNIFICANT CHANGE UP (ref 0–6)
GAS PNL BLDV: 135 MMOL/L — LOW (ref 136–145)
GAS PNL BLDV: SIGNIFICANT CHANGE UP
GAS PNL BLDV: SIGNIFICANT CHANGE UP
GLUCOSE BLDV-MCNC: 118 MG/DL — HIGH (ref 70–99)
GLUCOSE SERPL-MCNC: 120 MG/DL — HIGH (ref 70–99)
GLUCOSE SERPL-MCNC: 123 MG/DL — HIGH (ref 70–99)
GLUCOSE UR QL: NEGATIVE — SIGNIFICANT CHANGE UP
HCO3 BLDV-SCNC: 27 MMOL/L — SIGNIFICANT CHANGE UP (ref 22–29)
HCT VFR BLD CALC: 36.6 % — LOW (ref 39–50)
HCT VFR BLD CALC: 37.4 % — LOW (ref 39–50)
HCT VFR BLDA CALC: 39 % — SIGNIFICANT CHANGE UP (ref 39–51)
HGB BLD CALC-MCNC: 13 G/DL — SIGNIFICANT CHANGE UP (ref 12.6–17.4)
HGB BLD-MCNC: 12.6 G/DL — LOW (ref 13–17)
HGB BLD-MCNC: 13.2 G/DL — SIGNIFICANT CHANGE UP (ref 13–17)
IMM GRANULOCYTES NFR BLD AUTO: 0.2 % — SIGNIFICANT CHANGE UP (ref 0–1.5)
IMM GRANULOCYTES NFR BLD AUTO: 0.6 % — SIGNIFICANT CHANGE UP (ref 0–1.5)
INR BLD: 1.16 RATIO — SIGNIFICANT CHANGE UP (ref 0.88–1.16)
KETONES UR-MCNC: ABNORMAL
LACTATE BLDV-MCNC: 0.8 MMOL/L — SIGNIFICANT CHANGE UP (ref 0.7–2)
LEUKOCYTE ESTERASE UR-ACNC: NEGATIVE — SIGNIFICANT CHANGE UP
LIDOCAIN IGE QN: 17 U/L — SIGNIFICANT CHANGE UP (ref 7–60)
LYMPHOCYTES # BLD AUTO: 0.98 K/UL — LOW (ref 1–3.3)
LYMPHOCYTES # BLD AUTO: 1 K/UL — SIGNIFICANT CHANGE UP (ref 1–3.3)
LYMPHOCYTES # BLD AUTO: 10.8 % — LOW (ref 13–44)
LYMPHOCYTES # BLD AUTO: 12.3 % — LOW (ref 13–44)
MAGNESIUM SERPL-MCNC: 2 MG/DL — SIGNIFICANT CHANGE UP (ref 1.6–2.6)
MCHC RBC-ENTMCNC: 30.4 PG — SIGNIFICANT CHANGE UP (ref 27–34)
MCHC RBC-ENTMCNC: 30.8 PG — SIGNIFICANT CHANGE UP (ref 27–34)
MCHC RBC-ENTMCNC: 34.4 GM/DL — SIGNIFICANT CHANGE UP (ref 32–36)
MCHC RBC-ENTMCNC: 35.3 GM/DL — SIGNIFICANT CHANGE UP (ref 32–36)
MCV RBC AUTO: 87.4 FL — SIGNIFICANT CHANGE UP (ref 80–100)
MCV RBC AUTO: 88.4 FL — SIGNIFICANT CHANGE UP (ref 80–100)
MONOCYTES # BLD AUTO: 0.29 K/UL — SIGNIFICANT CHANGE UP (ref 0–0.9)
MONOCYTES # BLD AUTO: 0.53 K/UL — SIGNIFICANT CHANGE UP (ref 0–0.9)
MONOCYTES NFR BLD AUTO: 3.6 % — SIGNIFICANT CHANGE UP (ref 2–14)
MONOCYTES NFR BLD AUTO: 5.7 % — SIGNIFICANT CHANGE UP (ref 2–14)
NEUTROPHILS # BLD AUTO: 6.61 K/UL — SIGNIFICANT CHANGE UP (ref 1.8–7.4)
NEUTROPHILS # BLD AUTO: 7.71 K/UL — HIGH (ref 1.8–7.4)
NEUTROPHILS NFR BLD AUTO: 82.9 % — HIGH (ref 43–77)
NEUTROPHILS NFR BLD AUTO: 83 % — HIGH (ref 43–77)
NITRITE UR-MCNC: NEGATIVE — SIGNIFICANT CHANGE UP
NRBC # BLD: 0 /100 WBCS — SIGNIFICANT CHANGE UP (ref 0–0)
NRBC # BLD: 0 /100 WBCS — SIGNIFICANT CHANGE UP (ref 0–0)
PCO2 BLDV: 42 MMHG — SIGNIFICANT CHANGE UP (ref 42–55)
PH BLDV: 7.41 — SIGNIFICANT CHANGE UP (ref 7.32–7.43)
PH UR: 6 — SIGNIFICANT CHANGE UP (ref 5–8)
PHOSPHATE SERPL-MCNC: 3 MG/DL — SIGNIFICANT CHANGE UP (ref 2.5–4.5)
PLATELET # BLD AUTO: 218 K/UL — SIGNIFICANT CHANGE UP (ref 150–400)
PLATELET # BLD AUTO: 219 K/UL — SIGNIFICANT CHANGE UP (ref 150–400)
PO2 BLDV: 56 MMHG — HIGH (ref 25–45)
POTASSIUM BLDV-SCNC: 3.5 MMOL/L — SIGNIFICANT CHANGE UP (ref 3.5–5.1)
POTASSIUM SERPL-MCNC: 3.6 MMOL/L — SIGNIFICANT CHANGE UP (ref 3.5–5.3)
POTASSIUM SERPL-MCNC: 3.7 MMOL/L — SIGNIFICANT CHANGE UP (ref 3.5–5.3)
POTASSIUM SERPL-SCNC: 3.6 MMOL/L — SIGNIFICANT CHANGE UP (ref 3.5–5.3)
POTASSIUM SERPL-SCNC: 3.7 MMOL/L — SIGNIFICANT CHANGE UP (ref 3.5–5.3)
PROT SERPL-MCNC: 6.8 G/DL — SIGNIFICANT CHANGE UP (ref 6–8.3)
PROT SERPL-MCNC: 7.3 G/DL — SIGNIFICANT CHANGE UP (ref 6–8.3)
PROT UR-MCNC: NEGATIVE — SIGNIFICANT CHANGE UP
PROTHROM AB SERPL-ACNC: 13.4 SEC — SIGNIFICANT CHANGE UP (ref 10.5–13.4)
RBC # BLD: 4.14 M/UL — LOW (ref 4.2–5.8)
RBC # BLD: 4.28 M/UL — SIGNIFICANT CHANGE UP (ref 4.2–5.8)
RBC # FLD: 12.6 % — SIGNIFICANT CHANGE UP (ref 10.3–14.5)
RBC # FLD: 12.6 % — SIGNIFICANT CHANGE UP (ref 10.3–14.5)
RH IG SCN BLD-IMP: POSITIVE — SIGNIFICANT CHANGE UP
SAO2 % BLDV: 90.3 % — HIGH (ref 67–88)
SARS-COV-2 RNA SPEC QL NAA+PROBE: SIGNIFICANT CHANGE UP
SODIUM SERPL-SCNC: 135 MMOL/L — SIGNIFICANT CHANGE UP (ref 135–145)
SODIUM SERPL-SCNC: 137 MMOL/L — SIGNIFICANT CHANGE UP (ref 135–145)
SP GR SPEC: 1.03 — HIGH (ref 1.01–1.02)
UROBILINOGEN FLD QL: NEGATIVE — SIGNIFICANT CHANGE UP
WBC # BLD: 7.97 K/UL — SIGNIFICANT CHANGE UP (ref 3.8–10.5)
WBC # BLD: 9.29 K/UL — SIGNIFICANT CHANGE UP (ref 3.8–10.5)
WBC # FLD AUTO: 7.97 K/UL — SIGNIFICANT CHANGE UP (ref 3.8–10.5)
WBC # FLD AUTO: 9.29 K/UL — SIGNIFICANT CHANGE UP (ref 3.8–10.5)

## 2022-03-21 PROCEDURE — 76705 ECHO EXAM OF ABDOMEN: CPT | Mod: 26,RT

## 2022-03-21 RX ORDER — POTASSIUM CHLORIDE 20 MEQ
10 PACKET (EA) ORAL
Refills: 0 | Status: COMPLETED | OUTPATIENT
Start: 2022-03-21 | End: 2022-03-21

## 2022-03-21 RX ORDER — SODIUM CHLORIDE 9 MG/ML
1000 INJECTION, SOLUTION INTRAVENOUS
Refills: 0 | Status: DISCONTINUED | OUTPATIENT
Start: 2022-03-21 | End: 2022-03-22

## 2022-03-21 RX ORDER — AMPICILLIN SODIUM AND SULBACTAM SODIUM 250; 125 MG/ML; MG/ML
3 INJECTION, POWDER, FOR SUSPENSION INTRAMUSCULAR; INTRAVENOUS ONCE
Refills: 0 | Status: COMPLETED | OUTPATIENT
Start: 2022-03-21 | End: 2022-03-21

## 2022-03-21 RX ORDER — INFLUENZA VIRUS VACCINE 15; 15; 15; 15 UG/.5ML; UG/.5ML; UG/.5ML; UG/.5ML
0.5 SUSPENSION INTRAMUSCULAR ONCE
Refills: 0 | Status: DISCONTINUED | OUTPATIENT
Start: 2022-03-21 | End: 2022-03-25

## 2022-03-21 RX ORDER — HYDROMORPHONE HYDROCHLORIDE 2 MG/ML
0.5 INJECTION INTRAMUSCULAR; INTRAVENOUS; SUBCUTANEOUS EVERY 4 HOURS
Refills: 0 | Status: DISCONTINUED | OUTPATIENT
Start: 2022-03-21 | End: 2022-03-23

## 2022-03-21 RX ORDER — ENOXAPARIN SODIUM 100 MG/ML
40 INJECTION SUBCUTANEOUS EVERY 24 HOURS
Refills: 0 | Status: DISCONTINUED | OUTPATIENT
Start: 2022-03-21 | End: 2022-03-23

## 2022-03-21 RX ORDER — AMPICILLIN SODIUM AND SULBACTAM SODIUM 250; 125 MG/ML; MG/ML
3 INJECTION, POWDER, FOR SUSPENSION INTRAMUSCULAR; INTRAVENOUS EVERY 6 HOURS
Refills: 0 | Status: DISCONTINUED | OUTPATIENT
Start: 2022-03-21 | End: 2022-03-23

## 2022-03-21 RX ORDER — ACETAMINOPHEN 500 MG
1000 TABLET ORAL ONCE
Refills: 0 | Status: COMPLETED | OUTPATIENT
Start: 2022-03-21 | End: 2022-03-22

## 2022-03-21 RX ORDER — MORPHINE SULFATE 50 MG/1
4 CAPSULE, EXTENDED RELEASE ORAL ONCE
Refills: 0 | Status: DISCONTINUED | OUTPATIENT
Start: 2022-03-21 | End: 2022-03-21

## 2022-03-21 RX ADMIN — AMPICILLIN SODIUM AND SULBACTAM SODIUM 200 GRAM(S): 250; 125 INJECTION, POWDER, FOR SUSPENSION INTRAMUSCULAR; INTRAVENOUS at 06:45

## 2022-03-21 RX ADMIN — SODIUM CHLORIDE 100 MILLILITER(S): 9 INJECTION, SOLUTION INTRAVENOUS at 04:00

## 2022-03-21 RX ADMIN — MORPHINE SULFATE 4 MILLIGRAM(S): 50 CAPSULE, EXTENDED RELEASE ORAL at 00:20

## 2022-03-21 RX ADMIN — MORPHINE SULFATE 4 MILLIGRAM(S): 50 CAPSULE, EXTENDED RELEASE ORAL at 02:33

## 2022-03-21 RX ADMIN — ENOXAPARIN SODIUM 40 MILLIGRAM(S): 100 INJECTION SUBCUTANEOUS at 03:38

## 2022-03-21 RX ADMIN — SODIUM CHLORIDE 100 MILLILITER(S): 9 INJECTION, SOLUTION INTRAVENOUS at 11:50

## 2022-03-21 RX ADMIN — Medication 100 MILLIEQUIVALENT(S): at 12:51

## 2022-03-21 RX ADMIN — SODIUM CHLORIDE 1000 MILLILITER(S): 9 INJECTION INTRAMUSCULAR; INTRAVENOUS; SUBCUTANEOUS at 00:17

## 2022-03-21 RX ADMIN — MORPHINE SULFATE 4 MILLIGRAM(S): 50 CAPSULE, EXTENDED RELEASE ORAL at 01:21

## 2022-03-21 RX ADMIN — AMPICILLIN SODIUM AND SULBACTAM SODIUM 200 GRAM(S): 250; 125 INJECTION, POWDER, FOR SUSPENSION INTRAMUSCULAR; INTRAVENOUS at 23:11

## 2022-03-21 RX ADMIN — Medication 100 MILLIEQUIVALENT(S): at 17:57

## 2022-03-21 RX ADMIN — Medication 975 MILLIGRAM(S): at 01:21

## 2022-03-21 RX ADMIN — Medication 975 MILLIGRAM(S): at 00:21

## 2022-03-21 RX ADMIN — FAMOTIDINE 20 MILLIGRAM(S): 10 INJECTION INTRAVENOUS at 00:20

## 2022-03-21 RX ADMIN — AMPICILLIN SODIUM AND SULBACTAM SODIUM 200 GRAM(S): 250; 125 INJECTION, POWDER, FOR SUSPENSION INTRAMUSCULAR; INTRAVENOUS at 03:32

## 2022-03-21 RX ADMIN — AMPICILLIN SODIUM AND SULBACTAM SODIUM 200 GRAM(S): 250; 125 INJECTION, POWDER, FOR SUSPENSION INTRAMUSCULAR; INTRAVENOUS at 17:57

## 2022-03-21 RX ADMIN — Medication 100 MILLIEQUIVALENT(S): at 14:37

## 2022-03-21 RX ADMIN — AMPICILLIN SODIUM AND SULBACTAM SODIUM 200 GRAM(S): 250; 125 INJECTION, POWDER, FOR SUSPENSION INTRAMUSCULAR; INTRAVENOUS at 11:50

## 2022-03-21 NOTE — ED PROVIDER NOTE - ATTENDING CONTRIBUTION TO CARE
Lenny - 52yo M w hx of hernia repair in the ED for RUQ pain today, similar stx 1 year prior, was told he has gallstones. No associated stx. Mild-moderate, non-radiating.   VS wnl, well appearing, in NAD. Moist mucosae, pink conjunctivae. Neck supple, neuro grossly intact. Lungs clear, cardiac wnl, no JVD. Abdomen soft/mild TTP to RUQ and R CVAT. No pedal edema, no calf TTP.  Will get abdominal labs, UA, UCx, US, reevaluate. Likely mild biliary colic

## 2022-03-21 NOTE — ED PROVIDER NOTE - OBJECTIVE STATEMENT
52yo M no pmh presenting to ED with 1 day of post-prandial RUQ pain, now constant, severe, non-radiating with no other modifying factors. Had similar pain 1 year ago, was found to have 52yo M h.o R inguinal hernia repair presenting to ED with 1 day of post-prandial RUQ pain, now constant, severe, non-radiating with no other modifying factors. Had similar pain 1 year ago, was found to have non-obstructing gallstones, was scheduled to f/u with Dr. Regan outpatient however never went because he felt better. Mild chills, no fevers. Last BM today, normal, no urinary symptoms. ROS otherwise negative.

## 2022-03-21 NOTE — PATIENT PROFILE ADULT - FALL HARM RISK - UNIVERSAL INTERVENTIONS
Bed in lowest position, wheels locked, appropriate side rails in place/Call bell, personal items and telephone in reach/Instruct patient to call for assistance before getting out of bed or chair/Non-slip footwear when patient is out of bed/[place] to call system/Physically safe environment - no spills, clutter or unnecessary equipment/Purposeful Proactive Rounding/Room/bathroom lighting operational, light cord in reach

## 2022-03-21 NOTE — H&P ADULT - ATTENDING COMMENTS
date of service 3/21/22    pt seen and examined  pt chart and imaging reviewed in detail  agree with note above  51M with acute kamila and hx of gallstones  admit to red team sx  npo, ivf  iv abx  f/u labs in am  placed on addon schedule for lap kamila possible open  d/w pt / family r/b/a & agree with plan   consent signed in chart

## 2022-03-21 NOTE — H&P ADULT - NSHPPHYSICALEXAM_GEN_ALL_CORE
T(C): 36.9 (03-21-22 @ 03:00), Max: 36.9 (03-21-22 @ 03:00)  HR: 57 (03-21-22 @ 03:00) (57 - 60)  BP: 112/70 (03-21-22 @ 03:00) (112/70 - 117/74)  RR: 16 (03-21-22 @ 03:00) (16 - 16)  SpO2: 96% (03-21-22 @ 03:00) (95% - 96%)    Physical Exam:  General: NAD, alert  HEENT: normocephalic, PERRLA  CVS: RRR, no murmur  Chest: CTABL  Abdomen: RUQ tenderness, Roy positive, no rebound or guarding  Extremities: peripheral pulses palpable

## 2022-03-21 NOTE — ED PROVIDER NOTE - CLINICAL SUMMARY MEDICAL DECISION MAKING FREE TEXT BOX
50yo M w prior gall stones now with RUQ pain since eating earlier today. HD stable, abdomen non-tender, suspect cholecystitis > Pancreatitis > gastritis. Will get RUQ US and reassess after pain meds and labs.

## 2022-03-21 NOTE — ED PROVIDER NOTE - PROGRESS NOTE DETAILS
Dane Lugo, PGY-2: Pt reexamined at bedside, resting comfortably, vitals stable. RUQ US with stone in neck and pericholecystic fluid. Surgery consulted for acute cholecystitis.

## 2022-03-21 NOTE — PATIENT PROFILE ADULT - NS PRO AD NO ADVANCE DIRECTIVE
IR CT RN note:    Site Confirmed with MD, patient and RN pre procedure  Consents signed by patient, MD, and RN pre medication   MD verbal order to sedate patient to desired sedation level for procedure with medication ranges in MAR      Right pelvis drain placement + retroperitoneal pelvis drain placement by MD Mcdonnell assisted by CT Tech Nina, right lower abdomen +  access site;   Right anterior pelvis drain placed   1. BS FLexima APDL Locking pigtail  12 Fr x 25 cm   REF# [id number]  LOT# [id number]    20 mL sample sent to lab      Right buttock drain placed   2. BS FLexima APDL Locking pigtail  12 Fr x 25 cm   REF# [id number]  LOT# [id number]    20 ml sample sent to lab   70 ml total removed   End Tidal CO2 range 18-33 during procedure   Patient tolerated procedure, hemodynamically stable; pt awake and talking post procedure; report given to MIKHAIL Downing;   patient transported back to room via IR RN monitored then transferred care to report RN       RN informed to release signed and held orders on patients arrival to room        No

## 2022-03-21 NOTE — PATIENT PROFILE ADULT - HOME ACCESSIBILITY CONCERNS
pt a&ox3 BIB wheelchair from home c/o sharp, shooting, constant bilateral knee pain that started last night. Pt took 800mg motrin PO at home with no relief. Pt denies injury, fall, denies fever/ chills. none

## 2022-03-21 NOTE — H&P ADULT - HISTORY OF PRESENT ILLNESS
51M previous right inguinal hernia repair in 2010 followed by laparoscopic repair for recurrence in 2011 by Dr Regan presented to ED with 1 day of post prandial sudden onset constant RUQ pain. Patient has presented 1 year ago for biliary colic, he was recommended to follow up in office with Dr Regan, but did not follow up as his symptoms resolved. Workup now c/w acute calculous cholecystitis.

## 2022-03-21 NOTE — H&P ADULT - NSHPLABSRESULTS_GEN_ALL_CORE
Labs:  CAPILLARY BLOOD GLUCOSE                              13.2   7.97  )-----------( 219      ( 21 Mar 2022 00:25 )             37.4       Auto Neutrophil %: 82.9 % (22 @ 00:25)  Auto Immature Granulocyte %: 0.6 % (22 @ 00:25)        137  |  101  |  19  ----------------------------<  123<H>  3.7   |  24  |  0.72      Calcium, Total Serum: 8.9 mg/dL (22 @ 00:25)      LFTs:             7.3  | 0.4  | 19       ------------------[71      ( 21 Mar 2022 00:25 )  4.6  | x    | 31          Lipase:17     Amylase:x         Blood Gas Venous - Lactate: 0.8 mmol/L (22 @ 00:25)    Coags:    Urinalysis Basic - ( 21 Mar 2022 01:44 )    Color: Yellow / Appearance: Clear / S.026 / pH: x  Gluc: x / Ketone: Moderate  / Bili: Negative / Urobili: Negative   Blood: x / Protein: Negative / Nitrite: Negative   Leuk Esterase: Negative / RBC: x / WBC x   Sq Epi: x / Non Sq Epi: x / Bacteria: x    Radiology:  < from: US Abdomen Upper Quadrant Right (22 @ 00:55) >    IMPRESSION:    Cholelithiasis with a gallstone in the gallbladder neck and trace   pericholecystic fluid without wall thickening or a positive sonographic   Roy's sign and a dilated common bile duct similar to the abdominal   ultrasound from 3/30/2021. Correlate with LFTs and physical examination.   If clinical concern persists for an acute cholecystitis, consider a   follow-up nuclear HIDA scan for further evaluation.    < end of copied text >

## 2022-03-21 NOTE — ED ADULT NURSE REASSESSMENT NOTE - NS ED NURSE REASSESS COMMENT FT1
Patient made aware pending urine specimen collection. Patient provided with hospital socks, ambulates independently to restroom. Covering RN to send UA/UC as ordered.

## 2022-03-21 NOTE — ED ADULT NURSE NOTE - OBJECTIVE STATEMENT
Received patient with h/o hernia repair, HLD c/o abdominal pain denies nausea/vomiting or diarrhea, NAD awaits further evaluation.

## 2022-03-21 NOTE — H&P ADULT - ASSESSMENT
Assessment:  51y Male with no PMH, PSH of right inguinal hernia repair followed by lap b/l hernia repair (TEPP) following year by Dr Regan. Patient here with RUQ pain, RUQ tenderness exam. No fever of leukocytosis. LFT normal. Imaging shows GS with pericholecystic fluid, CBD dilated (which was unchanged compared to last year). Overall impression c/w acute calculous cholecystitis.    Plan:  - admit to surgery  - NPO, IVF  - IV antibiotics  - added on for OR for laparoscopic cholecystectomy  - plan dw Dr Regan    Red Team  [id number]

## 2022-03-22 ENCOUNTER — TRANSCRIPTION ENCOUNTER (OUTPATIENT)
Age: 52
End: 2022-03-22

## 2022-03-22 LAB
ALBUMIN SERPL ELPH-MCNC: 4.1 G/DL — SIGNIFICANT CHANGE UP (ref 3.3–5)
ALP SERPL-CCNC: 79 U/L — SIGNIFICANT CHANGE UP (ref 40–120)
ALT FLD-CCNC: 56 U/L — HIGH (ref 10–45)
ANION GAP SERPL CALC-SCNC: 11 MMOL/L — SIGNIFICANT CHANGE UP (ref 5–17)
APTT BLD: 43.5 SEC — HIGH (ref 27.5–35.5)
AST SERPL-CCNC: 24 U/L — SIGNIFICANT CHANGE UP (ref 10–40)
BILIRUB SERPL-MCNC: 0.9 MG/DL — SIGNIFICANT CHANGE UP (ref 0.2–1.2)
BUN SERPL-MCNC: 8 MG/DL — SIGNIFICANT CHANGE UP (ref 7–23)
CALCIUM SERPL-MCNC: 8.9 MG/DL — SIGNIFICANT CHANGE UP (ref 8.4–10.5)
CHLORIDE SERPL-SCNC: 97 MMOL/L — SIGNIFICANT CHANGE UP (ref 96–108)
CO2 SERPL-SCNC: 26 MMOL/L — SIGNIFICANT CHANGE UP (ref 22–31)
CREAT SERPL-MCNC: 0.63 MG/DL — SIGNIFICANT CHANGE UP (ref 0.5–1.3)
CULTURE RESULTS: SIGNIFICANT CHANGE UP
EGFR: 114 ML/MIN/1.73M2 — SIGNIFICANT CHANGE UP
GLUCOSE SERPL-MCNC: 97 MG/DL — SIGNIFICANT CHANGE UP (ref 70–99)
HCT VFR BLD CALC: 38.8 % — LOW (ref 39–50)
HGB BLD-MCNC: 13.2 G/DL — SIGNIFICANT CHANGE UP (ref 13–17)
INR BLD: 1.16 RATIO — SIGNIFICANT CHANGE UP (ref 0.88–1.16)
MAGNESIUM SERPL-MCNC: 2 MG/DL — SIGNIFICANT CHANGE UP (ref 1.6–2.6)
MCHC RBC-ENTMCNC: 30.2 PG — SIGNIFICANT CHANGE UP (ref 27–34)
MCHC RBC-ENTMCNC: 34 GM/DL — SIGNIFICANT CHANGE UP (ref 32–36)
MCV RBC AUTO: 88.8 FL — SIGNIFICANT CHANGE UP (ref 80–100)
NRBC # BLD: 0 /100 WBCS — SIGNIFICANT CHANGE UP (ref 0–0)
PHOSPHATE SERPL-MCNC: 2.6 MG/DL — SIGNIFICANT CHANGE UP (ref 2.5–4.5)
PLATELET # BLD AUTO: 219 K/UL — SIGNIFICANT CHANGE UP (ref 150–400)
POTASSIUM SERPL-MCNC: 3.5 MMOL/L — SIGNIFICANT CHANGE UP (ref 3.5–5.3)
POTASSIUM SERPL-SCNC: 3.5 MMOL/L — SIGNIFICANT CHANGE UP (ref 3.5–5.3)
PROT SERPL-MCNC: 7.1 G/DL — SIGNIFICANT CHANGE UP (ref 6–8.3)
PROTHROM AB SERPL-ACNC: 13.5 SEC — HIGH (ref 10.5–13.4)
RBC # BLD: 4.37 M/UL — SIGNIFICANT CHANGE UP (ref 4.2–5.8)
RBC # FLD: 12.6 % — SIGNIFICANT CHANGE UP (ref 10.3–14.5)
SODIUM SERPL-SCNC: 134 MMOL/L — LOW (ref 135–145)
SPECIMEN SOURCE: SIGNIFICANT CHANGE UP
WBC # BLD: 10.13 K/UL — SIGNIFICANT CHANGE UP (ref 3.8–10.5)
WBC # FLD AUTO: 10.13 K/UL — SIGNIFICANT CHANGE UP (ref 3.8–10.5)

## 2022-03-22 RX ORDER — OXYCODONE HYDROCHLORIDE 5 MG/1
1 TABLET ORAL
Qty: 12 | Refills: 0
Start: 2022-03-22 | End: 2022-03-24

## 2022-03-22 RX ORDER — ACETAMINOPHEN 500 MG
650 TABLET ORAL EVERY 6 HOURS
Refills: 0 | Status: DISCONTINUED | OUTPATIENT
Start: 2022-03-22 | End: 2022-03-23

## 2022-03-22 RX ORDER — ACETAMINOPHEN 500 MG
2 TABLET ORAL
Qty: 40 | Refills: 0
Start: 2022-03-22 | End: 2022-03-26

## 2022-03-22 RX ORDER — DEXTROSE MONOHYDRATE, SODIUM CHLORIDE, AND POTASSIUM CHLORIDE 50; .745; 4.5 G/1000ML; G/1000ML; G/1000ML
1000 INJECTION, SOLUTION INTRAVENOUS
Refills: 0 | Status: DISCONTINUED | OUTPATIENT
Start: 2022-03-22 | End: 2022-03-22

## 2022-03-22 RX ORDER — DEXTROSE MONOHYDRATE, SODIUM CHLORIDE, AND POTASSIUM CHLORIDE 50; .745; 4.5 G/1000ML; G/1000ML; G/1000ML
1000 INJECTION, SOLUTION INTRAVENOUS
Refills: 0 | Status: DISCONTINUED | OUTPATIENT
Start: 2022-03-22 | End: 2022-03-23

## 2022-03-22 RX ADMIN — Medication 400 MILLIGRAM(S): at 13:55

## 2022-03-22 RX ADMIN — Medication 650 MILLIGRAM(S): at 20:34

## 2022-03-22 RX ADMIN — AMPICILLIN SODIUM AND SULBACTAM SODIUM 200 GRAM(S): 250; 125 INJECTION, POWDER, FOR SUSPENSION INTRAMUSCULAR; INTRAVENOUS at 18:31

## 2022-03-22 RX ADMIN — AMPICILLIN SODIUM AND SULBACTAM SODIUM 200 GRAM(S): 250; 125 INJECTION, POWDER, FOR SUSPENSION INTRAMUSCULAR; INTRAVENOUS at 06:19

## 2022-03-22 RX ADMIN — Medication 650 MILLIGRAM(S): at 21:04

## 2022-03-22 RX ADMIN — AMPICILLIN SODIUM AND SULBACTAM SODIUM 200 GRAM(S): 250; 125 INJECTION, POWDER, FOR SUSPENSION INTRAMUSCULAR; INTRAVENOUS at 12:52

## 2022-03-22 RX ADMIN — ENOXAPARIN SODIUM 40 MILLIGRAM(S): 100 INJECTION SUBCUTANEOUS at 03:08

## 2022-03-22 RX ADMIN — Medication 1000 MILLIGRAM(S): at 14:30

## 2022-03-22 RX ADMIN — DEXTROSE MONOHYDRATE, SODIUM CHLORIDE, AND POTASSIUM CHLORIDE 100 MILLILITER(S): 50; .745; 4.5 INJECTION, SOLUTION INTRAVENOUS at 03:07

## 2022-03-22 NOTE — DISCHARGE NOTE PROVIDER - CARE PROVIDER_API CALL
Patel Regan)  Surgery  [address] [facility], [address]  [place] [address]  Phone: [phone]  Fax: [phone]  Follow Up Time: 1 week

## 2022-03-22 NOTE — PROGRESS NOTE ADULT - SUBJECTIVE AND OBJECTIVE BOX
Red Surgery Progress Note      SUBJECTIVE: Patient seen and examined at bedside with surgical team.  .     OBJECTIVE:    Vital Signs Last 24 Hrs  T(C): 36.7 (22 Mar 2022 00:59), Max: 37.3 (21 Mar 2022 21:54)  T(F): 98.1 (22 Mar 2022 00:59), Max: 99.1 (21 Mar 2022 21:54)  HR: 54 (22 Mar 2022 00:59) (54 - 74)  BP: 111/67 (21 Mar 2022 21:54) (109/56 - 135/84)  BP(mean): --  RR: 18 (22 Mar 2022 00:59) (16 - 18)  SpO2: 97% (22 Mar 2022 00:59) (96% - 98%)I&O's Detail    20 Mar 2022 07:01  -  21 Mar 2022 07:00  --------------------------------------------------------  IN:    Lactated Ringers: 200 mL  Total IN: 200 mL    OUT:    Voided (mL): 250 mL  Total OUT: 250 mL    Total NET: -50 mL      21 Mar 2022 07:01  -  22 Mar 2022 01:35  --------------------------------------------------------  IN:    IV PiggyBack: 500 mL    Lactated Ringers: 1200 mL  Total IN: 1700 mL    OUT:    Oral Fluid: 0 mL    Voided (mL): 1600 mL  Total OUT: 1600 mL    Total NET: 100 mL      MEDICATIONS  (STANDING):  ampicillin/sulbactam  IVPB 3 Gram(s) IV Intermittent every 6 hours  enoxaparin Injectable 40 milliGRAM(s) SubCutaneous every 24 hours  influenza   Vaccine 0.5 milliLiter(s) IntraMuscular once  lactated ringers. 1000 milliLiter(s) (100 mL/Hr) IV Continuous <Continuous>    MEDICATIONS  (PRN):  acetaminophen   IVPB .. 1000 milliGRAM(s) IV Intermittent once PRN Mild Pain (1 - 3)  HYDROmorphone  Injectable 0.5 milliGRAM(s) IV Push every 4 hours PRN Severe Pain (7 - 10)      PHYSICAL EXAM:  General: NAD, alert  HEENT: normocephalic, PERRLA  CVS: RRR, no murmur  Chest: CTABL  Abdomen: RUQ tenderness, Roy positive, no rebound or guarding  Extremities: peripheral pulses palpableLABS:                        12.6   9.29  )-----------( 218      ( 21 Mar 2022 06:42 )             36.6     03-    135  |  101  |  17  ----------------------------<  120<H>  3.6   |  24  |  0.65    Ca    8.4      21 Mar 2022 06:42  Phos  3.0     03-  Mg     2.0     -    TPro  6.8  /  Alb  4.2  /  TBili  0.8  /  DBili  0.2  /  AST  98<H>  /  ALT  84<H>  /  AlkPhos  83  03-21    PT/INR - ( 21 Mar 2022 04:00 )   PT: 13.4 sec;   INR: 1.16 ratio         PTT - ( 21 Mar 2022 04:00 )  PTT:38.7 sec  LIVER FUNCTIONS - ( 21 Mar 2022 06:42 )  Alb: 4.2 g/dL / Pro: 6.8 g/dL / ALK PHOS: 83 U/L / ALT: 84 U/L / AST: 98 U/L / GGT: x           Urinalysis Basic - ( 21 Mar 2022 01:44 )    Color: Yellow / Appearance: Clear / S.026 / pH: x  Gluc: x / Ketone: Moderate  / Bili: Negative / Urobili: Negative   Blood: x / Protein: Negative / Nitrite: Negative   Leuk Esterase: Negative / RBC: x / WBC x   Sq Epi: x / Non Sq Epi: x / Bacteria: x           Red Surgery Progress Note      SUBJECTIVE: Patient seen and examined at bedside with surgical team. Reports pain is controlled. Remains NPO-Anticipating surgery.     OBJECTIVE:  Vital Signs Last 24 Hrs  T(C): 36.7 (22 Mar 2022 00:59), Max: 37.3 (21 Mar 2022 21:54)  T(F): 98.1 (22 Mar 2022 00:59), Max: 99.1 (21 Mar 2022 21:54)  HR: 54 (22 Mar 2022 00:59) (54 - 74)  BP: 111/67 (21 Mar 2022 21:54) (109/56 - 135/84)  BP(mean): --  RR: 18 (22 Mar 2022 00:59) (16 - 18)  SpO2: 97% (22 Mar 2022 00:59) (96% - 98%)I&O's Detail    20 Mar 2022 07:01  -  21 Mar 2022 07:00  --------------------------------------------------------  IN:    Lactated Ringers: 200 mL  Total IN: 200 mL    OUT:    Voided (mL): 250 mL  Total OUT: 250 mL  Total NET: -50 mL      21 Mar 2022 07:01  -  22 Mar 2022 01:35  --------------------------------------------------------  IN:    IV PiggyBack: 500 mL    Lactated Ringers: 1200 mL  Total IN: 1700 mL    OUT:    Oral Fluid: 0 mL    Voided (mL): 1600 mL  Total OUT: 1600 mL  Total NET: 100 mL      MEDICATIONS  (STANDING):  ampicillin/sulbactam  IVPB 3 Gram(s) IV Intermittent every 6 hours  enoxaparin Injectable 40 milliGRAM(s) SubCutaneous every 24 hours  influenza   Vaccine 0.5 milliLiter(s) IntraMuscular once  lactated ringers. 1000 milliLiter(s) (100 mL/Hr) IV Continuous <Continuous>    MEDICATIONS  (PRN):  acetaminophen   IVPB .. 1000 milliGRAM(s) IV Intermittent once PRN Mild Pain (1 - 3)  HYDROmorphone  Injectable 0.5 milliGRAM(s) IV Push every 4 hours PRN Severe Pain (7 - 10)    PHYSICAL EXAM:  General: NAD, alert  HEENT: normocephalic, PERRLA  Abdomen: RUQ tenderness, Roy positive, no rebound or guarding  Extremities: peripheral pulses palpable    LABS:                        12.6   9.29  )-----------( 218      ( 21 Mar 2022 06:42 )             36.6     03-    135  |  101  |  17  ----------------------------<  120<H>  3.6   |  24  |  0.65    Ca    8.4      21 Mar 2022 06:42  Phos  3.0     -  Mg     2.0         TPro  6.8  /  Alb  4.2  /  TBili  0.8  /  DBili  0.2  /  AST  98<H>  /  ALT  84<H>  /  AlkPhos  83  03-21    PT/INR - ( 21 Mar 2022 04:00 )   PT: 13.4 sec;   INR: 1.16 ratio         PTT - ( 21 Mar 2022 04:00 )  PTT:38.7 sec  LIVER FUNCTIONS - ( 21 Mar 2022 06:42 )  Alb: 4.2 g/dL / Pro: 6.8 g/dL / ALK PHOS: 83 U/L / ALT: 84 U/L / AST: 98 U/L / GGT: x           Urinalysis Basic - ( 21 Mar 2022 01:44 )    Color: Yellow / Appearance: Clear / S.026 / pH: x  Gluc: x / Ketone: Moderate  / Bili: Negative / Urobili: Negative   Blood: x / Protein: Negative / Nitrite: Negative   Leuk Esterase: Negative / RBC: x / WBC x   Sq Epi: x / Non Sq Epi: x / Bacteria: x           Red Surgery Progress Note    SUBJECTIVE: Patient seen and examined at bedside with surgical team. Reports pain is controlled. Remains NPO-Anticipating surgery.     OBJECTIVE:  Vital Signs Last 24 Hrs  T(C): 36.7 (22 Mar 2022 00:59), Max: 37.3 (21 Mar 2022 21:54)  T(F): 98.1 (22 Mar 2022 00:59), Max: 99.1 (21 Mar 2022 21:54)  HR: 54 (22 Mar 2022 00:59) (54 - 74)  BP: 111/67 (21 Mar 2022 21:54) (109/56 - 135/84)  BP(mean): --  RR: 18 (22 Mar 2022 00:59) (16 - 18)  SpO2: 97% (22 Mar 2022 00:59) (96% - 98%)I&O's Detail    20 Mar 2022 07:01  -  21 Mar 2022 07:00  --------------------------------------------------------  IN:    Lactated Ringers: 200 mL  Total IN: 200 mL    OUT:    Voided (mL): 250 mL  Total OUT: 250 mL  Total NET: -50 mL      21 Mar 2022 07:01  -  22 Mar 2022 01:35  --------------------------------------------------------  IN:    IV PiggyBack: 500 mL    Lactated Ringers: 1200 mL  Total IN: 1700 mL    OUT:    Oral Fluid: 0 mL    Voided (mL): 1600 mL  Total OUT: 1600 mL  Total NET: 100 mL      MEDICATIONS  (STANDING):  ampicillin/sulbactam  IVPB 3 Gram(s) IV Intermittent every 6 hours  enoxaparin Injectable 40 milliGRAM(s) SubCutaneous every 24 hours  influenza   Vaccine 0.5 milliLiter(s) IntraMuscular once  lactated ringers. 1000 milliLiter(s) (100 mL/Hr) IV Continuous <Continuous>    MEDICATIONS  (PRN):  acetaminophen   IVPB .. 1000 milliGRAM(s) IV Intermittent once PRN Mild Pain (1 - 3)  HYDROmorphone  Injectable 0.5 milliGRAM(s) IV Push every 4 hours PRN Severe Pain (7 - 10)    PHYSICAL EXAM:  General: NAD, alert  HEENT: normocephalic, PERRLA  Abdomen: RUQ tenderness, Roy positive, no rebound or guarding  Extremities: peripheral pulses palpable    LABS:                        12.6   9.29  )-----------( 218      ( 21 Mar 2022 06:42 )             36.6     03-    135  |  101  |  17  ----------------------------<  120<H>  3.6   |  24  |  0.65    Ca    8.4      21 Mar 2022 06:42  Phos  3.0     -  Mg     2.0         TPro  6.8  /  Alb  4.2  /  TBili  0.8  /  DBili  0.2  /  AST  98<H>  /  ALT  84<H>  /  AlkPhos  83  03-21    PT/INR - ( 21 Mar 2022 04:00 )   PT: 13.4 sec;   INR: 1.16 ratio         PTT - ( 21 Mar 2022 04:00 )  PTT:38.7 sec  LIVER FUNCTIONS - ( 21 Mar 2022 06:42 )  Alb: 4.2 g/dL / Pro: 6.8 g/dL / ALK PHOS: 83 U/L / ALT: 84 U/L / AST: 98 U/L / GGT: x           Urinalysis Basic - ( 21 Mar 2022 01:44 )    Color: Yellow / Appearance: Clear / S.026 / pH: x  Gluc: x / Ketone: Moderate  / Bili: Negative / Urobili: Negative   Blood: x / Protein: Negative / Nitrite: Negative   Leuk Esterase: Negative / RBC: x / WBC x   Sq Epi: x / Non Sq Epi: x / Bacteria: x

## 2022-03-22 NOTE — PROGRESS NOTE ADULT - ASSESSMENT
Assessment:  51y Male with no PMH, PSH of right inguinal hernia repair followed by lap b/l hernia repair (TEPP) following year by Dr Regan. Patient here with RUQ pain, RUQ tenderness exam. No fever of leukocytosis. LFT normal. Imaging shows GS with pericholecystic fluid, CBD dilated (which was unchanged compared to last year). Overall impression c/w acute calculous cholecystitis.    Plan:  - OR today for lap kamila  - NPO, IVF  - IV antibiotics, unasyn   - pain control   - dvt ppx      Red Team  2000 Assessment:  51y Male with no PMH, PSH of right inguinal hernia repair followed by lap b/l hernia repair (TEPP) following year by Dr Regan. Patient here with RUQ pain, RUQ tenderness exam. No fever of leukocytosis. LFT normal. Imaging shows GS with pericholecystic fluid, CBD dilated (which was unchanged compared to last year). Overall impression c/w acute calculous cholecystitis.    Plan:  - OR today for lap kamila  - NPO, IVF  - IV antibiotics, unasyn   - Pain control   - Dvt ppx    Red Team  [id number] Assessment:  51y Male with no PMH, PSH of right inguinal hernia repair followed by lap b/l hernia repair (TEPP) following year by Dr Regan. Patient here with RUQ pain, RUQ tenderness exam. No fever of leukocytosis. LFT normal. Imaging shows GS with pericholecystic fluid, CBD dilated (which was unchanged compared to last year). Overall impression c/w acute calculous cholecystitis.    Plan:  - OR tomorrow for lap kamila  - Clears for today, NPO after midnight  - IV antibiotics, unasyn   - Pain control   - Dvt ppx    Red Team  [id number]

## 2022-03-22 NOTE — DISCHARGE NOTE PROVIDER - NSDCFUADDINST_GEN_ALL_CORE_FT
Please take tylenol as needed for mild pain. You may take oxycodone 2.5mg (1/2 tab) as needed every 4-6 hours for moderate breakthrough pain or oxycodone 5mg (1 tab) every 4-6 hours as needed for severe breakthrough pain.    Taking narcotic pain medication may cause constipation, nausea, fatigue, dizziness, and itchiness- these are the most common side effects.     You can take over the counter stool softeners ie. Senna or Miralax to prevent constipation. Please do not take stool softeners if you are having soft/loose bowel movements.     Do not drive, operate machinery, or make important decisions if taking narcotic pain medications.    Notify your physician/surgeon and return to ER for difficulty breathing, shortness of breath, for temperatures greater than 101, pain not controlled with pain medications, persistent nausea and vomiting, or acutely concerning matters to you, that may require urgent medical attention.  Please take tylenol as needed for mild pain, do n ot take more than 4grams in 24hours. You may take oxycodone 2.5mg (1/2 tab) as needed every 4-6 hours for moderate breakthrough pain or oxycodone 5mg (1 tab) every 4-6 hours as needed for severe breakthrough pain.    Taking narcotic pain medication may cause constipation, nausea, fatigue, dizziness, and itchiness- these are the most common side effects.     You can take over the counter stool softeners ie. Senna or Miralax to prevent constipation. Please do not take stool softeners if you are having soft/loose bowel movements.     Do not drive, operate machinery, or make important decisions if taking narcotic pain medications.    Notify your physician/surgeon and return to ER for difficulty breathing, shortness of breath, for temperatures greater than 101, pain not controlled with pain medications, persistent nausea and vomiting, or acutely concerning matters to you, that may require urgent medical attention.

## 2022-03-22 NOTE — DISCHARGE NOTE PROVIDER - NSDCMRMEDTOKEN_GEN_ALL_CORE_FT
amoxicillin-clavulanate 875 mg-125 mg oral tablet: 1 tab(s) orally every 12 hours x 5 days  polyethylene glycol 3350 oral powder for reconstitution: 17 gram(s) orally once  senna oral tablet: 2 tab(s) orally once a day (at bedtime)   amoxicillin-clavulanate 875 mg-125 mg oral tablet: 1 tab(s) orally every 12 hours x 5 days  oxyCODONE 5 mg oral tablet: 1 tab(s) orally every 6 hours MDD:4  polyethylene glycol 3350 oral powder for reconstitution: 17 gram(s) orally once  senna oral tablet: 2 tab(s) orally once a day (at bedtime)

## 2022-03-22 NOTE — DISCHARGE NOTE PROVIDER - NSDCCPCAREPLAN_GEN_ALL_CORE_FT
PRINCIPAL DISCHARGE DIAGNOSIS  Diagnosis: Acute cholecystitis  Assessment and Plan of Treatment: WOUND CARE:   You may remove the clear dressing, underneath you will find steri strips resembling tape. Please leave these on. If they come off on their own- that is okay. Otherwise, your surgeon will remove them in the office.   BATHING: You may shower and/or sponge bathe.  ACTIVITY: No heavy lifting anything more than 10-15lbs or straining. Otherwise, you may return to your usual level of physical activity. If you are taking narcotic pain medication (such as Percocet), do NOT drive a car, operate machinery or make important decisions.  NOTIFY YOUR SURGEON IF: You have any bleeding that does not stop, any fever (over 100.4 F) or chills, persistent nausea/vomiting with inability to tolerate food or liquids, persistent diarrhea, or if your pain is not controlled on your discharge pain medications.  FOLLOW-UP:  Please follow up with  in one week regarding your hospitalization.       PRINCIPAL DISCHARGE DIAGNOSIS  Diagnosis: Acute cholecystitis  Assessment and Plan of Treatment: WOUND CARE: **********  You may remove the clear dressing, underneath you will find steri strips resembling tape. Please leave these on. If they come off on their own- that is okay. Otherwise, your surgeon will remove them in the office.   BATHING: You may shower and/or sponge bathe.   ACTIVITY: No heavy lifting anything more than 10-15lbs or straining. Otherwise, you may return to your usual level of physical activity. If you are taking narcotic pain medication (such as oxycodone), do NOT drive a car, operate machinery or make important decisions.  NOTIFY YOUR SURGEON IF: You have any bleeding that does not stop, any fever (over 100.4 F) or chills, persistent nausea/vomiting with inability to tolerate food or liquids, persistent diarrhea, or if your pain is not controlled on your discharge pain medications.  FOLLOW-UP:  Please follow up with  in one week regarding your hospitalization.       PRINCIPAL DISCHARGE DIAGNOSIS  Diagnosis: Acute cholecystitis  Assessment and Plan of Treatment: WOUND CARE: Remove outer plastics dressing- underneath are little white bandaids called steristrips let soap/water run over and pat dry after showering, steri strips will fall off on their own in 1-2 weeks   BATHING: You may shower and/or sponge bathe.   ACTIVITY: No heavy lifting anything more than 10-15lbs or straining. Otherwise, you may return to your usual level of physical activity. If you are taking narcotic pain medication (such as oxycodone), do NOT drive a car, operate machinery or make important decisions.  DIET: regular   NOTIFY YOUR SURGEON IF: You have any bleeding that does not stop, any fever (over 100.4 F) or chills, persistent nausea/vomiting with inability to tolerate food or liquids, persistent diarrhea, or if your pain is not controlled on your discharge pain medications.  FOLLOW-UP:  -Please call to schedule a follow up appointment with  in one week after discharge

## 2022-03-22 NOTE — DISCHARGE NOTE PROVIDER - NSDCACTIVITY_GEN_ALL_CORE
No heavy lifting/straining/Follow Instructions Provided by your Surgical Team Walking - Indoors allowed/No heavy lifting/straining/Walking - Outdoors allowed/Follow Instructions Provided by your Surgical Team

## 2022-03-22 NOTE — DISCHARGE NOTE PROVIDER - HOSPITAL COURSE
51M previous right inguinal hernia repair in 2010 followed by laparoscopic repair for recurrence in 2011 by Dr Regan presented to ED with 1 day of post prandial sudden onset constant RUQ pain. Patient has presented 1 year ago for biliary colic, he was recommended to follow up in office with Dr Regan, but did not follow up as his symptoms resolved. Workup now c/w acute calculous cholecystitis.  On 3/21 patient underwent laparoscopic cholecystectomy.  In the PACU, the patients' pain was controlled, vitals stable, tolerating PO, and voiding appropriately.  The patient was transferred to the surgical floor in stable condition.   On day of discharge, the patient was tolerating diet, ambulating well and pain controlled.   All questions were answered and patient safely discharged home. 51M previous right inguinal hernia repair in 2010 followed by laparoscopic repair for recurrence in 2011 by Dr Regan presented to ED with 1 day of post prandial sudden onset constant RUQ pain. Patient has presented 1 year ago for biliary colic, he was recommended to follow up in office with Dr Regan, but did not follow up as his symptoms resolved. Workup now c/w acute calculous cholecystitis.  On 3/23 patient underwent laparoscopic cholecystectomy.  In the PACU, the patients' pain was controlled, vitals stable, tolerating PO, and voiding appropriately.  The patient was transferred to the surgical floor in stable condition.   On day of discharge, the patient was tolerating diet, ambulating well and pain controlled.   All questions were answered and patient safely discharged home. 51M previous right inguinal hernia repair in 2010 followed by laparoscopic repair for recurrence in 2011 by Dr Regan presented to ED with 1 day of post prandial sudden onset constant RUQ pain. Patient has presented 1 year ago for biliary colic, he was recommended to follow up in office with Dr Regan, but did not follow up as his symptoms resolved. Workup now c/w acute calculous cholecystitis.    On 3/23 patient underwent laparoscopic cholecystectomy; operative findings: severely inflamed and edematous intrahepatic gallbladder, cystic duct and artery clipped and transected. Minimal bile spillage, small liver laceration from retraction noted, hemostasis achieved. The patient tolerated the operation well and there were no post-operative complications identified. In the PACU, the patients' pain was controlled, vitals stable, tolerating PO, and voiding appropriately.  The patient was transferred to the surgical floor in stable condition.  On day of discharge, the patient was tolerating diet, ambulating well and pain controlled. Pt to follow up with Dr Regan within one week of discharge. 51M previous right inguinal hernia repair in 2010 followed by laparoscopic repair for recurrence in 2011 by Dr Regan presented to ED with 1 day of post prandial sudden onset constant RUQ pain. Patient has presented 1 year ago for biliary colic, he was recommended to follow up in office with Dr Regan, but did not follow up as his symptoms resolved. Workup now c/w acute calculous cholecystitis.    On 3/23 patient underwent laparoscopic cholecystectomy; operative findings: severely inflamed and edematous intrahepatic gallbladder, cystic duct and artery clipped and transected. Minimal bile spillage, small liver laceration from retraction noted, hemostasis achieved. The patient tolerated the operation well and there were no post-operative complications identified. In the PACU, the patients' pain was controlled, vitals stable, tolerating PO, and voiding appropriately and transferred to floors.  POD 1 overnight pt with fever, workup sent which revealed******    Fevers resolved, pt continued to recover appropriately and was deemed stable for dc.  On day of discharge, the patient was HDS, tolerating diet, ambulating well and pain controlled. Pt to follow up with Dr Regan within one week of discharge. 51M previous right inguinal hernia repair in 2010 followed by laparoscopic repair for recurrence in 2011 by Dr Regan presented to ED with 1 day of post prandial sudden onset constant RUQ pain. Patient has presented 1 year ago for biliary colic, he was recommended to follow up in office with Dr Reagn, but did not follow up as his symptoms resolved. Workup now c/w acute calculous cholecystitis.    On 3/23 patient underwent laparoscopic cholecystectomy; operative findings: severely inflamed and edematous intrahepatic gallbladder, cystic duct and artery clipped and transected. Minimal bile spillage, small liver laceration from retraction noted, hemostasis achieved. The patient tolerated the operation well and there were no post-operative complications identified. In the PACU, the patients' pain was controlled, vitals stable, tolerating PO, and voiding appropriately and transferred to floors.  POD 1 overnight pt with fever, workup sent which revealed, ua neg, ******    Fevers resolved, pt continued to recover appropriately and was deemed stable for dc.  On day of discharge, the patient was HDS, tolerating diet, ambulating well and pain controlled. Pt to follow up with Dr Regan within one week of discharge. 51M previous right inguinal hernia repair in 2010 followed by laparoscopic repair for recurrence in 2011 by Dr Regan presented to ED with 1 day of post prandial sudden onset constant RUQ pain. Patient has presented 1 year ago for biliary colic, he was recommended to follow up in office with Dr Regan, but did not follow up as his symptoms resolved. Workup now c/w acute calculous cholecystitis.    On 3/23 patient underwent laparoscopic cholecystectomy; operative findings: severely inflamed and edematous intrahepatic gallbladder, cystic duct and artery clipped and transected. Minimal bile spillage, small liver laceration from retraction noted, hemostasis achieved. The patient tolerated the operation well and there were no post-operative complications identified. In the PACU, the patients' pain was controlled, vitals stable, tolerating PO, and voiding appropriately and transferred to floors.  POD 1 overnight pt with fever, workup sent which revealed, ua neg.    Fevers resolved, pt continued to recover appropriately and was deemed stable for dc.  On day of discharge, the patient was  tolerating diet, ambulating well and pain controlled. Pt to follow up with Dr Regan within one week of discharge.

## 2022-03-23 ENCOUNTER — RESULT REVIEW (OUTPATIENT)
Age: 52
End: 2022-03-23

## 2022-03-23 LAB
ANION GAP SERPL CALC-SCNC: 14 MMOL/L — SIGNIFICANT CHANGE UP (ref 5–17)
BUN SERPL-MCNC: 9 MG/DL — SIGNIFICANT CHANGE UP (ref 7–23)
CALCIUM SERPL-MCNC: 8.6 MG/DL — SIGNIFICANT CHANGE UP (ref 8.4–10.5)
CHLORIDE SERPL-SCNC: 96 MMOL/L — SIGNIFICANT CHANGE UP (ref 96–108)
CO2 SERPL-SCNC: 25 MMOL/L — SIGNIFICANT CHANGE UP (ref 22–31)
CREAT SERPL-MCNC: 0.71 MG/DL — SIGNIFICANT CHANGE UP (ref 0.5–1.3)
EGFR: 110 ML/MIN/1.73M2 — SIGNIFICANT CHANGE UP
GLUCOSE SERPL-MCNC: 104 MG/DL — HIGH (ref 70–99)
HCT VFR BLD CALC: 38 % — LOW (ref 39–50)
HGB BLD-MCNC: 13.3 G/DL — SIGNIFICANT CHANGE UP (ref 13–17)
MAGNESIUM SERPL-MCNC: 1.9 MG/DL — SIGNIFICANT CHANGE UP (ref 1.6–2.6)
MCHC RBC-ENTMCNC: 30.5 PG — SIGNIFICANT CHANGE UP (ref 27–34)
MCHC RBC-ENTMCNC: 35 GM/DL — SIGNIFICANT CHANGE UP (ref 32–36)
MCV RBC AUTO: 87.2 FL — SIGNIFICANT CHANGE UP (ref 80–100)
NRBC # BLD: 0 /100 WBCS — SIGNIFICANT CHANGE UP (ref 0–0)
PHOSPHATE SERPL-MCNC: 2.5 MG/DL — SIGNIFICANT CHANGE UP (ref 2.5–4.5)
PLATELET # BLD AUTO: 185 K/UL — SIGNIFICANT CHANGE UP (ref 150–400)
POTASSIUM SERPL-MCNC: 3.8 MMOL/L — SIGNIFICANT CHANGE UP (ref 3.5–5.3)
POTASSIUM SERPL-SCNC: 3.8 MMOL/L — SIGNIFICANT CHANGE UP (ref 3.5–5.3)
RBC # BLD: 4.36 M/UL — SIGNIFICANT CHANGE UP (ref 4.2–5.8)
RBC # FLD: 12.5 % — SIGNIFICANT CHANGE UP (ref 10.3–14.5)
SODIUM SERPL-SCNC: 135 MMOL/L — SIGNIFICANT CHANGE UP (ref 135–145)
WBC # BLD: 11.65 K/UL — HIGH (ref 3.8–10.5)
WBC # FLD AUTO: 11.65 K/UL — HIGH (ref 3.8–10.5)

## 2022-03-23 PROCEDURE — 88304 TISSUE EXAM BY PATHOLOGIST: CPT | Mod: 26

## 2022-03-23 DEVICE — SURGICEL NU-KNIT 6 X 9": Type: IMPLANTABLE DEVICE | Status: FUNCTIONAL

## 2022-03-23 DEVICE — LIGATING CLIPS WECK HEMOLOK POLYMER MEDIUM-LARGE (GREEN) 6: Type: IMPLANTABLE DEVICE | Status: FUNCTIONAL

## 2022-03-23 DEVICE — ARISTA 3GR: Type: IMPLANTABLE DEVICE | Status: FUNCTIONAL

## 2022-03-23 RX ORDER — OXYCODONE HYDROCHLORIDE 5 MG/1
5 TABLET ORAL EVERY 8 HOURS
Refills: 0 | Status: DISCONTINUED | OUTPATIENT
Start: 2022-03-23 | End: 2022-03-25

## 2022-03-23 RX ORDER — POTASSIUM PHOSPHATE, MONOBASIC POTASSIUM PHOSPHATE, DIBASIC 236; 224 MG/ML; MG/ML
30 INJECTION, SOLUTION INTRAVENOUS ONCE
Refills: 0 | Status: COMPLETED | OUTPATIENT
Start: 2022-03-23 | End: 2022-03-23

## 2022-03-23 RX ORDER — ONDANSETRON 8 MG/1
4 TABLET, FILM COATED ORAL ONCE
Refills: 0 | Status: DISCONTINUED | OUTPATIENT
Start: 2022-03-23 | End: 2022-03-23

## 2022-03-23 RX ORDER — FENTANYL CITRATE 50 UG/ML
50 INJECTION INTRAVENOUS
Refills: 0 | Status: DISCONTINUED | OUTPATIENT
Start: 2022-03-23 | End: 2022-03-23

## 2022-03-23 RX ORDER — HYDROMORPHONE HYDROCHLORIDE 2 MG/ML
0.5 INJECTION INTRAMUSCULAR; INTRAVENOUS; SUBCUTANEOUS
Refills: 0 | Status: DISCONTINUED | OUTPATIENT
Start: 2022-03-23 | End: 2022-03-23

## 2022-03-23 RX ORDER — MAGNESIUM SULFATE 500 MG/ML
2 VIAL (ML) INJECTION ONCE
Refills: 0 | Status: COMPLETED | OUTPATIENT
Start: 2022-03-23 | End: 2022-03-23

## 2022-03-23 RX ORDER — ACETAMINOPHEN 500 MG
650 TABLET ORAL EVERY 6 HOURS
Refills: 0 | Status: DISCONTINUED | OUTPATIENT
Start: 2022-03-23 | End: 2022-03-25

## 2022-03-23 RX ORDER — OXYCODONE HYDROCHLORIDE 5 MG/1
2.5 TABLET ORAL EVERY 6 HOURS
Refills: 0 | Status: DISCONTINUED | OUTPATIENT
Start: 2022-03-23 | End: 2022-03-25

## 2022-03-23 RX ORDER — SODIUM CHLORIDE 9 MG/ML
1000 INJECTION, SOLUTION INTRAVENOUS
Refills: 0 | Status: DISCONTINUED | OUTPATIENT
Start: 2022-03-23 | End: 2022-03-24

## 2022-03-23 RX ADMIN — SODIUM CHLORIDE 125 MILLILITER(S): 9 INJECTION, SOLUTION INTRAVENOUS at 16:54

## 2022-03-23 RX ADMIN — AMPICILLIN SODIUM AND SULBACTAM SODIUM 200 GRAM(S): 250; 125 INJECTION, POWDER, FOR SUSPENSION INTRAMUSCULAR; INTRAVENOUS at 00:16

## 2022-03-23 RX ADMIN — POTASSIUM PHOSPHATE, MONOBASIC POTASSIUM PHOSPHATE, DIBASIC 83.33 MILLIMOLE(S): 236; 224 INJECTION, SOLUTION INTRAVENOUS at 18:54

## 2022-03-23 RX ADMIN — SODIUM CHLORIDE 125 MILLILITER(S): 9 INJECTION, SOLUTION INTRAVENOUS at 15:57

## 2022-03-23 RX ADMIN — Medication 25 GRAM(S): at 16:55

## 2022-03-23 RX ADMIN — ENOXAPARIN SODIUM 40 MILLIGRAM(S): 100 INJECTION SUBCUTANEOUS at 05:02

## 2022-03-23 RX ADMIN — AMPICILLIN SODIUM AND SULBACTAM SODIUM 200 GRAM(S): 250; 125 INJECTION, POWDER, FOR SUSPENSION INTRAMUSCULAR; INTRAVENOUS at 05:02

## 2022-03-23 RX ADMIN — DEXTROSE MONOHYDRATE, SODIUM CHLORIDE, AND POTASSIUM CHLORIDE 100 MILLILITER(S): 50; .745; 4.5 INJECTION, SOLUTION INTRAVENOUS at 00:16

## 2022-03-23 NOTE — PROGRESS NOTE ADULT - SUBJECTIVE AND OBJECTIVE BOX
[facility] Surgery Progress Note      SUBJECTIVE: Patient seen and examined at bedside with surgical team. No acute events overnight.     OBJECTIVE:    Vital Signs Last 24 Hrs  T(C): 36.7 (23 Mar 2022 00:35), Max: 37.6 (22 Mar 2022 04:11)  T(F): 98 (23 Mar 2022 00:35), Max: 99.7 (22 Mar 2022 04:11)  HR: 87 (23 Mar 2022 00:35) (58 - 87)  BP: 104/58 (23 Mar 2022 00:35) (101/61 - 117/70)  BP(mean): --  RR: 18 (23 Mar 2022 00:35) (18 - 18)  SpO2: 98% (23 Mar 2022 00:35) (94% - 98%)I&O's Detail    21 Mar 2022 07:01  -  22 Mar 2022 07:00  --------------------------------------------------------  IN:    IV PiggyBack: 700 mL    Lactated Ringers: 2000 mL    sodium chloride 0.45% w/ Additives: 400 mL  Total IN: 3100 mL    OUT:    Oral Fluid: 0 mL    Voided (mL): 2400 mL    Voided (mL): 400 mL  Total OUT: 2800 mL    Total NET: 300 mL      22 Mar 2022 07:01  -  23 Mar 2022 02:37  --------------------------------------------------------  IN:    IV PiggyBack: 300 mL    Oral Fluid: 260 mL  Total IN: 560 mL    OUT:    Voided (mL): 1100 mL  Total OUT: 1100 mL    Total NET: -540 mL      MEDICATIONS  (STANDING):  ampicillin/sulbactam  IVPB 3 Gram(s) IV Intermittent every 6 hours  dextrose 5% + sodium chloride 0.45% with potassium chloride 20 mEq/L 1000 milliLiter(s) (100 mL/Hr) IV Continuous <Continuous>  enoxaparin Injectable 40 milliGRAM(s) SubCutaneous every 24 hours  influenza   Vaccine 0.5 milliLiter(s) IntraMuscular once    MEDICATIONS  (PRN):  acetaminophen     Tablet .. 650 milliGRAM(s) Oral every 6 hours PRN Mild Pain (1 - 3)  HYDROmorphone  Injectable 0.5 milliGRAM(s) IV Push every 4 hours PRN Severe Pain (7 - 10)      PHYSICAL EXAM:  Constitutional: A&Ox3, NAD  Respiratory: Unlabored breathing  Abdomen: Soft, nondistended, NTTP. No rebound or guarding.  Extremities: WWP, KOENIG spontaneously    LABS:                        13.2   10.13 )-----------( 219      ( 22 Mar 2022 06:33 )             38.8     03-22    134<L>  |  97  |  8   ----------------------------<  97  3.5   |  26  |  0.63    Ca    8.9      22 Mar 2022 06:32  Phos  2.6     03-22  Mg     2.0     03-22    TPro  7.1  /  Alb  4.1  /  TBili  0.9  /  DBili  x   /  AST  24  /  ALT  56<H>  /  AlkPhos  79  03-22    PT/INR - ( 22 Mar 2022 06:33 )   PT: 13.5 sec;   INR: 1.16 ratio         PTT - ( 22 Mar 2022 06:33 )  PTT:43.5 sec  LIVER FUNCTIONS - ( 22 Mar 2022 06:32 )  Alb: 4.1 g/dL / Pro: 7.1 g/dL / ALK PHOS: 79 U/L / ALT: 56 U/L / AST: 24 U/L / GGT: x                  [facility] Surgery Progress Note      SUBJECTIVE: Patient seen and examined at bedside with surgical team. No acute events overnight. Discussed plan for scheduled OR today for lap kamila. Patient without pain.    OBJECTIVE:    Vital Signs Last 24 Hrs  T(C): 36.7 (23 Mar 2022 00:35), Max: 37.6 (22 Mar 2022 04:11)  T(F): 98 (23 Mar 2022 00:35), Max: 99.7 (22 Mar 2022 04:11)  HR: 87 (23 Mar 2022 00:35) (58 - 87)  BP: 104/58 (23 Mar 2022 00:35) (101/61 - 117/70)  BP(mean): --  RR: 18 (23 Mar 2022 00:35) (18 - 18)  SpO2: 98% (23 Mar 2022 00:35) (94% - 98%)I&O's Detail    21 Mar 2022 07:01  -  22 Mar 2022 07:00  --------------------------------------------------------  IN:    IV PiggyBack: 700 mL    Lactated Ringers: 2000 mL    sodium chloride 0.45% w/ Additives: 400 mL  Total IN: 3100 mL    OUT:    Oral Fluid: 0 mL    Voided (mL): 2400 mL    Voided (mL): 400 mL  Total OUT: 2800 mL    Total NET: 300 mL      22 Mar 2022 07:01  -  23 Mar 2022 02:37  --------------------------------------------------------  IN:    IV PiggyBack: 300 mL    Oral Fluid: 260 mL  Total IN: 560 mL    OUT:    Voided (mL): 1100 mL  Total OUT: 1100 mL    Total NET: -540 mL      MEDICATIONS  (STANDING):  ampicillin/sulbactam  IVPB 3 Gram(s) IV Intermittent every 6 hours  dextrose 5% + sodium chloride 0.45% with potassium chloride 20 mEq/L 1000 milliLiter(s) (100 mL/Hr) IV Continuous <Continuous>  enoxaparin Injectable 40 milliGRAM(s) SubCutaneous every 24 hours  influenza   Vaccine 0.5 milliLiter(s) IntraMuscular once    MEDICATIONS  (PRN):  acetaminophen     Tablet .. 650 milliGRAM(s) Oral every 6 hours PRN Mild Pain (1 - 3)  HYDROmorphone  Injectable 0.5 milliGRAM(s) IV Push every 4 hours PRN Severe Pain (7 - 10)      PHYSICAL EXAM:  Constitutional: A&Ox3, NAD  Respiratory: Unlabored breathing  Abdomen: Soft, nondistended, NTTP. No rebound or guarding.  Extremities: WWP, KOENIG spontaneously    LABS:                        13.2   10.13 )-----------( 219      ( 22 Mar 2022 06:33 )             38.8     03-22    134<L>  |  97  |  8   ----------------------------<  97  3.5   |  26  |  0.63    Ca    8.9      22 Mar 2022 06:32  Phos  2.6     03-22  Mg     2.0     03-22    TPro  7.1  /  Alb  4.1  /  TBili  0.9  /  DBili  x   /  AST  24  /  ALT  56<H>  /  AlkPhos  79  03-22    PT/INR - ( 22 Mar 2022 06:33 )   PT: 13.5 sec;   INR: 1.16 ratio         PTT - ( 22 Mar 2022 06:33 )  PTT:43.5 sec  LIVER FUNCTIONS - ( 22 Mar 2022 06:32 )  Alb: 4.1 g/dL / Pro: 7.1 g/dL / ALK PHOS: 79 U/L / ALT: 56 U/L / AST: 24 U/L / GGT: x

## 2022-03-23 NOTE — BRIEF OPERATIVE NOTE - OPERATION/FINDINGS
severely inflamed and edematous intrahepatic gallbladder, cystic duct and artery clipped and transected. Minimal bile spillage, small liver laceration from retraction noted, hemostasis achieved w/ Nu-knit and Latricia hemostatic agents. Specimen removed, fascia and skin closed.

## 2022-03-23 NOTE — CHART NOTE - NSCHARTNOTEFT_GEN_A_CORE
Surgery Post-Op Note    Pre-Op Dx: acute cholecystitis   Procedure: Laparoscopic cholecystectomy  Surgeon: Dr. Regan     SUBJECTIVE:  Pt seen and examined at the bedside. Pt w/ no complaints. Denies F/C/N/V. Pain controlled with medication.     OBJECTIVE:  Vital Signs Last 24 Hrs  T(C): 36.7 (23 Mar 2022 18:13), Max: 37.6 (23 Mar 2022 05:43)  T(F): 98.1 (23 Mar 2022 18:13), Max: 99.7 (23 Mar 2022 05:43)  HR: 64 (23 Mar 2022 16:00) (61 - 87)  BP: 105/64 (23 Mar 2022 16:00) (101/61 - 113/69)  BP(mean): 78 (23 Mar 2022 15:30) (74 - 80)  RR: 18 (23 Mar 2022 18:13) (16 - 18)  SpO2: 95% (23 Mar 2022 18:13) (92% - 99%)    Physical Exam:  General: NAD, resting comfortably in bed  Neuro: no focal deficits  Pulmonary: Nonlabored breathing, no respiratory distress  Cardiovascular: NSR  Extremities: WWP    LABS:                        13.3   11.65 )-----------( 185      ( 23 Mar 2022 07:13 )             38.0     03-23    135  |  96  |  9   ----------------------------<  104<H>  3.8   |  25  |  0.71    Ca    8.6      23 Mar 2022 07:11  Phos  2.5     03-23  Mg     1.9     03-23    TPro  7.1  /  Alb  4.1  /  TBili  0.9  /  DBili  x   /  AST  24  /  ALT  56<H>  /  AlkPhos  79  03-22    PT/INR - ( 22 Mar 2022 06:33 )   PT: 13.5 sec;   INR: 1.16 ratio         PTT - ( 22 Mar 2022 06:33 )  PTT:43.5 sec  CAPILLARY BLOOD GLUCOSE          LIVER FUNCTIONS - ( 22 Mar 2022 06:32 )  Alb: 4.1 g/dL / Pro: 7.1 g/dL / ALK PHOS: 79 U/L / ALT: 56 U/L / AST: 24 U/L / GGT: x               IMAGING:    ASSESSMENT:51y Male now 4hours s/p lap kamila. Recovering well on floor.     PLAN:  - Pain control  - Encourage IS  - Nausea control PRN  - Monitor vitals  - Diet: reg  - Monitor I+Os  - OOB/ Ambulate  - DVT ppx    [facility] Surgery  [id number]

## 2022-03-23 NOTE — PROGRESS NOTE ADULT - ASSESSMENT
Assessment:  51y Male with no PMH, PSH of right inguinal hernia repair followed by lap b/l hernia repair (TEPP) following year by Dr Regan. Patient here with RUQ pain, RUQ tenderness exam. No fever of leukocytosis. LFT normal. Imaging shows GS with pericholecystic fluid, CBD dilated (which was unchanged compared to last year). Overall impression c/w acute calculous cholecystitis.    Plan:  - OR today for lap kamila  - IV antibiotics, unasyn   - Pain control   - Dvt ppx    Red Team  [id number] Assessment:  51y Male with no PMH, PSH of right inguinal hernia repair followed by lap b/l hernia repair (TEPP) following year by Dr Regan. Patient here with RUQ pain, RUQ tenderness exam. No fever of leukocytosis. LFT normal. Imaging shows GS with pericholecystic fluid, CBD dilated (which was unchanged compared to last year). Overall impression c/w acute calculous cholecystitis.    Plan:  - OR today for lap kamila 3/23  - IV antibiotics, unasyn   - Pain control   - Dvt ppx    Red Team  [id number]

## 2022-03-24 LAB
ALBUMIN SERPL ELPH-MCNC: 3.3 G/DL — SIGNIFICANT CHANGE UP (ref 3.3–5)
ALP SERPL-CCNC: 104 U/L — SIGNIFICANT CHANGE UP (ref 40–120)
ALT FLD-CCNC: 121 U/L — HIGH (ref 10–45)
ANION GAP SERPL CALC-SCNC: 11 MMOL/L — SIGNIFICANT CHANGE UP (ref 5–17)
APPEARANCE UR: CLEAR — SIGNIFICANT CHANGE UP
AST SERPL-CCNC: 77 U/L — HIGH (ref 10–40)
BACTERIA # UR AUTO: NEGATIVE — SIGNIFICANT CHANGE UP
BILIRUB DIRECT SERPL-MCNC: 0.2 MG/DL — SIGNIFICANT CHANGE UP (ref 0–0.3)
BILIRUB INDIRECT FLD-MCNC: 0.4 MG/DL — SIGNIFICANT CHANGE UP (ref 0.2–1)
BILIRUB SERPL-MCNC: 0.6 MG/DL — SIGNIFICANT CHANGE UP (ref 0.2–1.2)
BILIRUB UR-MCNC: NEGATIVE — SIGNIFICANT CHANGE UP
BUN SERPL-MCNC: 12 MG/DL — SIGNIFICANT CHANGE UP (ref 7–23)
CALCIUM SERPL-MCNC: 7.7 MG/DL — LOW (ref 8.4–10.5)
CHLORIDE SERPL-SCNC: 98 MMOL/L — SIGNIFICANT CHANGE UP (ref 96–108)
CO2 SERPL-SCNC: 25 MMOL/L — SIGNIFICANT CHANGE UP (ref 22–31)
COLOR SPEC: YELLOW — SIGNIFICANT CHANGE UP
CREAT SERPL-MCNC: 0.7 MG/DL — SIGNIFICANT CHANGE UP (ref 0.5–1.3)
DIFF PNL FLD: NEGATIVE — SIGNIFICANT CHANGE UP
EGFR: 111 ML/MIN/1.73M2 — SIGNIFICANT CHANGE UP
EPI CELLS # UR: 0 /HPF — SIGNIFICANT CHANGE UP
GLUCOSE SERPL-MCNC: 126 MG/DL — HIGH (ref 70–99)
GLUCOSE UR QL: NEGATIVE — SIGNIFICANT CHANGE UP
HCT VFR BLD CALC: 31.9 % — LOW (ref 39–50)
HCT VFR BLD CALC: 34.6 % — LOW (ref 39–50)
HGB BLD-MCNC: 11 G/DL — LOW (ref 13–17)
HGB BLD-MCNC: 11.6 G/DL — LOW (ref 13–17)
HYALINE CASTS # UR AUTO: 0 /LPF — SIGNIFICANT CHANGE UP (ref 0–2)
KETONES UR-MCNC: ABNORMAL
LEUKOCYTE ESTERASE UR-ACNC: NEGATIVE — SIGNIFICANT CHANGE UP
MAGNESIUM SERPL-MCNC: 2.2 MG/DL — SIGNIFICANT CHANGE UP (ref 1.6–2.6)
MCHC RBC-ENTMCNC: 29.7 PG — SIGNIFICANT CHANGE UP (ref 27–34)
MCHC RBC-ENTMCNC: 29.9 PG — SIGNIFICANT CHANGE UP (ref 27–34)
MCHC RBC-ENTMCNC: 33.5 GM/DL — SIGNIFICANT CHANGE UP (ref 32–36)
MCHC RBC-ENTMCNC: 34.5 GM/DL — SIGNIFICANT CHANGE UP (ref 32–36)
MCV RBC AUTO: 86.2 FL — SIGNIFICANT CHANGE UP (ref 80–100)
MCV RBC AUTO: 89.2 FL — SIGNIFICANT CHANGE UP (ref 80–100)
NITRITE UR-MCNC: NEGATIVE — SIGNIFICANT CHANGE UP
NRBC # BLD: 0 /100 WBCS — SIGNIFICANT CHANGE UP (ref 0–0)
NRBC # BLD: 0 /100 WBCS — SIGNIFICANT CHANGE UP (ref 0–0)
PH UR: 7 — SIGNIFICANT CHANGE UP (ref 5–8)
PHOSPHATE SERPL-MCNC: 2.1 MG/DL — LOW (ref 2.5–4.5)
PLATELET # BLD AUTO: 201 K/UL — SIGNIFICANT CHANGE UP (ref 150–400)
PLATELET # BLD AUTO: 205 K/UL — SIGNIFICANT CHANGE UP (ref 150–400)
POTASSIUM SERPL-MCNC: 3.8 MMOL/L — SIGNIFICANT CHANGE UP (ref 3.5–5.3)
POTASSIUM SERPL-SCNC: 3.8 MMOL/L — SIGNIFICANT CHANGE UP (ref 3.5–5.3)
PROT SERPL-MCNC: 6.5 G/DL — SIGNIFICANT CHANGE UP (ref 6–8.3)
PROT UR-MCNC: ABNORMAL
RBC # BLD: 3.7 M/UL — LOW (ref 4.2–5.8)
RBC # BLD: 3.88 M/UL — LOW (ref 4.2–5.8)
RBC # FLD: 12.5 % — SIGNIFICANT CHANGE UP (ref 10.3–14.5)
RBC # FLD: 12.7 % — SIGNIFICANT CHANGE UP (ref 10.3–14.5)
RBC CASTS # UR COMP ASSIST: 2 /HPF — SIGNIFICANT CHANGE UP (ref 0–4)
SODIUM SERPL-SCNC: 134 MMOL/L — LOW (ref 135–145)
SP GR SPEC: 1.02 — SIGNIFICANT CHANGE UP (ref 1.01–1.02)
UROBILINOGEN FLD QL: ABNORMAL
WBC # BLD: 8.31 K/UL — SIGNIFICANT CHANGE UP (ref 3.8–10.5)
WBC # BLD: 9.52 K/UL — SIGNIFICANT CHANGE UP (ref 3.8–10.5)
WBC # FLD AUTO: 8.31 K/UL — SIGNIFICANT CHANGE UP (ref 3.8–10.5)
WBC # FLD AUTO: 9.52 K/UL — SIGNIFICANT CHANGE UP (ref 3.8–10.5)
WBC UR QL: 1 /HPF — SIGNIFICANT CHANGE UP (ref 0–5)

## 2022-03-24 PROCEDURE — 71045 X-RAY EXAM CHEST 1 VIEW: CPT | Mod: 26

## 2022-03-24 RX ORDER — ENOXAPARIN SODIUM 100 MG/ML
40 INJECTION SUBCUTANEOUS EVERY 24 HOURS
Refills: 0 | Status: DISCONTINUED | OUTPATIENT
Start: 2022-03-24 | End: 2022-03-25

## 2022-03-24 RX ORDER — PIPERACILLIN AND TAZOBACTAM 4; .5 G/20ML; G/20ML
3.38 INJECTION, POWDER, LYOPHILIZED, FOR SOLUTION INTRAVENOUS ONCE
Refills: 0 | Status: COMPLETED | OUTPATIENT
Start: 2022-03-24 | End: 2022-03-24

## 2022-03-24 RX ORDER — ACETAMINOPHEN 500 MG
650 TABLET ORAL ONCE
Refills: 0 | Status: COMPLETED | OUTPATIENT
Start: 2022-03-24 | End: 2022-03-24

## 2022-03-24 RX ORDER — SODIUM,POTASSIUM PHOSPHATES 278-250MG
2 POWDER IN PACKET (EA) ORAL
Refills: 0 | Status: COMPLETED | OUTPATIENT
Start: 2022-03-24 | End: 2022-03-24

## 2022-03-24 RX ORDER — PIPERACILLIN AND TAZOBACTAM 4; .5 G/20ML; G/20ML
3.38 INJECTION, POWDER, LYOPHILIZED, FOR SOLUTION INTRAVENOUS EVERY 8 HOURS
Refills: 0 | Status: DISCONTINUED | OUTPATIENT
Start: 2022-03-24 | End: 2022-03-25

## 2022-03-24 RX ADMIN — Medication 650 MILLIGRAM(S): at 00:16

## 2022-03-24 RX ADMIN — ENOXAPARIN SODIUM 40 MILLIGRAM(S): 100 INJECTION SUBCUTANEOUS at 18:14

## 2022-03-24 RX ADMIN — PIPERACILLIN AND TAZOBACTAM 25 GRAM(S): 4; .5 INJECTION, POWDER, LYOPHILIZED, FOR SOLUTION INTRAVENOUS at 18:14

## 2022-03-24 RX ADMIN — Medication 2 PACKET(S): at 18:17

## 2022-03-24 RX ADMIN — Medication 2 PACKET(S): at 11:50

## 2022-03-24 RX ADMIN — Medication 650 MILLIGRAM(S): at 11:55

## 2022-03-24 RX ADMIN — Medication 650 MILLIGRAM(S): at 22:59

## 2022-03-24 RX ADMIN — PIPERACILLIN AND TAZOBACTAM 200 GRAM(S): 4; .5 INJECTION, POWDER, LYOPHILIZED, FOR SOLUTION INTRAVENOUS at 11:52

## 2022-03-24 RX ADMIN — Medication 650 MILLIGRAM(S): at 00:45

## 2022-03-24 RX ADMIN — Medication 650 MILLIGRAM(S): at 19:44

## 2022-03-24 NOTE — PROGRESS NOTE ADULT - SUBJECTIVE AND OBJECTIVE BOX
[facility] Surgery Progress Note      SUBJECTIVE: Patient seen and examined at bedside with surgical team. No acute events overnight.     OBJECTIVE:    Vital Signs Last 24 Hrs  T(C): 37.8 (24 Mar 2022 00:09), Max: 37.8 (24 Mar 2022 00:09)  T(F): 100.1 (24 Mar 2022 00:09), Max: 100.1 (24 Mar 2022 00:09)  HR: 72 (24 Mar 2022 00:09) (63 - 77)  BP: 100/58 (24 Mar 2022 00:09) (100/58 - 113/69)  BP(mean): 78 (23 Mar 2022 15:30) (74 - 80)  RR: 18 (24 Mar 2022 00:09) (16 - 18)  SpO2: 94% (24 Mar 2022 00:09) (92% - 99%)I&O's Detail    22 Mar 2022 07:01  -  23 Mar 2022 07:00  --------------------------------------------------------  IN:    dextrose 5% + sodium chloride 0.45% w/ Additives: 700 mL    IV PiggyBack: 500 mL    Oral Fluid: 260 mL  Total IN: 1460 mL    OUT:    Voided (mL): 2000 mL  Total OUT: 2000 mL    Total NET: -540 mL      23 Mar 2022 07:01  -  24 Mar 2022 01:38  --------------------------------------------------------  IN:    Lactated Ringers: 375 mL    Oral Fluid: 380 mL  Total IN: 755 mL    OUT:    Voided (mL): 1800 mL  Total OUT: 1800 mL    Total NET: -1045 mL      MEDICATIONS  (STANDING):  influenza   Vaccine 0.5 milliLiter(s) IntraMuscular once  lactated ringers. 1000 milliLiter(s) (125 mL/Hr) IV Continuous <Continuous>    MEDICATIONS  (PRN):  acetaminophen     Tablet .. 650 milliGRAM(s) Oral every 6 hours PRN Mild Pain (1 - 3)  oxyCODONE    IR 5 milliGRAM(s) Oral every 8 hours PRN Severe Pain (7 - 10)  oxyCODONE    IR 2.5 milliGRAM(s) Oral every 6 hours PRN Moderate Pain (4 - 6)      PHYSICAL EXAM:  Constitutional: A&Ox3, NAD  Respiratory: Unlabored breathing  Abdomen: Soft, nondistended, NTTP. No rebound or guarding.  Extremities: WWP, KOENIG spontaneously    LABS:                        13.3   11.65 )-----------( 185      ( 23 Mar 2022 07:13 )             38.0     03-23    135  |  96  |  9   ----------------------------<  104<H>  3.8   |  25  |  0.71    Ca    8.6      23 Mar 2022 07:11  Phos  2.5     03-23  Mg     1.9     03-23    TPro  7.1  /  Alb  4.1  /  TBili  0.9  /  DBili  x   /  AST  24  /  ALT  56<H>  /  AlkPhos  79  03-22    PT/INR - ( 22 Mar 2022 06:33 )   PT: 13.5 sec;   INR: 1.16 ratio         PTT - ( 22 Mar 2022 06:33 )  PTT:43.5 sec  LIVER FUNCTIONS - ( 22 Mar 2022 06:32 )  Alb: 4.1 g/dL / Pro: 7.1 g/dL / ALK PHOS: 79 U/L / ALT: 56 U/L / AST: 24 U/L / GGT: x                  [facility] Surgery Progress Note      SUBJECTIVE: Patient seen and examined at bedside with surgical team. Febrile to 101.7 overnight,     OBJECTIVE:    Vital Signs Last 24 Hrs  T(C): 37.8 (24 Mar 2022 00:09), Max: 37.8 (24 Mar 2022 00:09)  T(F): 100.1 (24 Mar 2022 00:09), Max: 100.1 (24 Mar 2022 00:09)  HR: 72 (24 Mar 2022 00:09) (63 - 77)  BP: 100/58 (24 Mar 2022 00:09) (100/58 - 113/69)  BP(mean): 78 (23 Mar 2022 15:30) (74 - 80)  RR: 18 (24 Mar 2022 00:09) (16 - 18)  SpO2: 94% (24 Mar 2022 00:09) (92% - 99%)I&O's Detail    22 Mar 2022 07:01  -  23 Mar 2022 07:00  --------------------------------------------------------  IN:    dextrose 5% + sodium chloride 0.45% w/ Additives: 700 mL    IV PiggyBack: 500 mL    Oral Fluid: 260 mL  Total IN: 1460 mL    OUT:    Voided (mL): 2000 mL  Total OUT: 2000 mL    Total NET: -540 mL      23 Mar 2022 07:01  -  24 Mar 2022 01:38  --------------------------------------------------------  IN:    Lactated Ringers: 375 mL    Oral Fluid: 380 mL  Total IN: 755 mL    OUT:    Voided (mL): 1800 mL  Total OUT: 1800 mL    Total NET: -1045 mL      MEDICATIONS  (STANDING):  influenza   Vaccine 0.5 milliLiter(s) IntraMuscular once  lactated ringers. 1000 milliLiter(s) (125 mL/Hr) IV Continuous <Continuous>    MEDICATIONS  (PRN):  acetaminophen     Tablet .. 650 milliGRAM(s) Oral every 6 hours PRN Mild Pain (1 - 3)  oxyCODONE    IR 5 milliGRAM(s) Oral every 8 hours PRN Severe Pain (7 - 10)  oxyCODONE    IR 2.5 milliGRAM(s) Oral every 6 hours PRN Moderate Pain (4 - 6)      PHYSICAL EXAM:  Constitutional: A&Ox3, NAD  Respiratory: Unlabored breathing  Abdomen: Soft, nondistended, NTTP. No rebound or guarding.  Extremities: WWP, KOENIG spontaneously    LABS:                        13.3   11.65 )-----------( 185      ( 23 Mar 2022 07:13 )             38.0     03-23    135  |  96  |  9   ----------------------------<  104<H>  3.8   |  25  |  0.71    Ca    8.6      23 Mar 2022 07:11  Phos  2.5     03-23  Mg     1.9     03-23    TPro  7.1  /  Alb  4.1  /  TBili  0.9  /  DBili  x   /  AST  24  /  ALT  56<H>  /  AlkPhos  79  03-22    PT/INR - ( 22 Mar 2022 06:33 )   PT: 13.5 sec;   INR: 1.16 ratio         PTT - ( 22 Mar 2022 06:33 )  PTT:43.5 sec  LIVER FUNCTIONS - ( 22 Mar 2022 06:32 )  Alb: 4.1 g/dL / Pro: 7.1 g/dL / ALK PHOS: 79 U/L / ALT: 56 U/L / AST: 24 U/L / GGT: x                  [facility] Surgery Progress Note      SUBJECTIVE: Patient seen and examined at bedside with surgical team. Febrile to 101.7 overnight. Says he has been having some intermittent epigastric pain. Has not yet tried to eat.    OBJECTIVE:    Vital Signs Last 24 Hrs  T(C): 37.8 (24 Mar 2022 00:09), Max: 37.8 (24 Mar 2022 00:09)  T(F): 100.1 (24 Mar 2022 00:09), Max: 100.1 (24 Mar 2022 00:09)  HR: 72 (24 Mar 2022 00:09) (63 - 77)  BP: 100/58 (24 Mar 2022 00:09) (100/58 - 113/69)  BP(mean): 78 (23 Mar 2022 15:30) (74 - 80)  RR: 18 (24 Mar 2022 00:09) (16 - 18)  SpO2: 94% (24 Mar 2022 00:09) (92% - 99%)I&O's Detail    22 Mar 2022 07:01  -  23 Mar 2022 07:00  --------------------------------------------------------  IN:    dextrose 5% + sodium chloride 0.45% w/ Additives: 700 mL    IV PiggyBack: 500 mL    Oral Fluid: 260 mL  Total IN: 1460 mL    OUT:    Voided (mL): 2000 mL  Total OUT: 2000 mL    Total NET: -540 mL      23 Mar 2022 07:01  -  24 Mar 2022 01:38  --------------------------------------------------------  IN:    Lactated Ringers: 375 mL    Oral Fluid: 380 mL  Total IN: 755 mL    OUT:    Voided (mL): 1800 mL  Total OUT: 1800 mL    Total NET: -1045 mL      MEDICATIONS  (STANDING):  influenza   Vaccine 0.5 milliLiter(s) IntraMuscular once  lactated ringers. 1000 milliLiter(s) (125 mL/Hr) IV Continuous <Continuous>    MEDICATIONS  (PRN):  acetaminophen     Tablet .. 650 milliGRAM(s) Oral every 6 hours PRN Mild Pain (1 - 3)  oxyCODONE    IR 5 milliGRAM(s) Oral every 8 hours PRN Severe Pain (7 - 10)  oxyCODONE    IR 2.5 milliGRAM(s) Oral every 6 hours PRN Moderate Pain (4 - 6)      PHYSICAL EXAM:  Constitutional: A&Ox3, NAD  Respiratory: Unlabored breathing  Abdomen: Soft, nondistended, NTTP. No rebound or guarding.  Extremities: WWP, KOENIG spontaneously    LABS:                        13.3   11.65 )-----------( 185      ( 23 Mar 2022 07:13 )             38.0     03-23    135  |  96  |  9   ----------------------------<  104<H>  3.8   |  25  |  0.71    Ca    8.6      23 Mar 2022 07:11  Phos  2.5     03-23  Mg     1.9     03-23    TPro  7.1  /  Alb  4.1  /  TBili  0.9  /  DBili  x   /  AST  24  /  ALT  56<H>  /  AlkPhos  79  03-22    PT/INR - ( 22 Mar 2022 06:33 )   PT: 13.5 sec;   INR: 1.16 ratio         PTT - ( 22 Mar 2022 06:33 )  PTT:43.5 sec  LIVER FUNCTIONS - ( 22 Mar 2022 06:32 )  Alb: 4.1 g/dL / Pro: 7.1 g/dL / ALK PHOS: 79 U/L / ALT: 56 U/L / AST: 24 U/L / GGT: x

## 2022-03-24 NOTE — PROGRESS NOTE ADULT - ASSESSMENT
Assessment:  51y Male with no PMH, PSH of right inguinal hernia repair followed by lap b/l hernia repair (TEPP) following year by Dr Regan. Patient here with RUQ pain, RUQ tenderness exam. No fever of leukocytosis. LFT normal. Imaging shows GS with pericholecystic fluid, CBD dilated (which was unchanged compared to last year). Overall impression c/w acute calculous cholecystitis. Now s/p lap kamila 3/23    Plan:  - diet: reg  - pain control prn   - dvt ppx  - f/u Am labs     Red Team  [id number] Assessment:  51y Male with no PMH, PSH of right inguinal hernia repair followed by lap b/l hernia repair (TEPP) following year by Dr Regan. Patient here with RUQ pain, RUQ tenderness exam. No fever of leukocytosis. LFT normal. Imaging shows GS with pericholecystic fluid, CBD dilated (which was unchanged compared to last year). Overall impression c/w acute calculous cholecystitis. Now s/p lap kamila 3/23    Plan:  - diet: reg  - pain control prn   - dvt ppx  - f/u Am labs   - likely dc today    Red Team  [id number] Assessment:  51y Male with no PMH, PSH of right inguinal hernia repair followed by lap b/l hernia repair (TEPP) following year by Dr Regan. Patient here with RUQ pain, RUQ tenderness exam. No fever of leukocytosis. LFT normal. Imaging shows GS with pericholecystic fluid, CBD dilated (which was unchanged compared to last year). Overall impression c/w acute calculous cholecystitis. Now s/p lap kamila 3/23    Plan:  - f/u fever workup  - diet: reg  - pain control prn   - dvt ppx  - f/u Am labs   - likely dc today    Red Team  [id number] Assessment:  51y Male with no PMH, PSH of right inguinal hernia repair followed by lap b/l hernia repair (TEPP) following year by Dr Regan. Patient here with RUQ pain, RUQ tenderness exam. No fever of leukocytosis. LFT normal. Imaging shows GS with pericholecystic fluid, CBD dilated (which was unchanged compared to last year). Overall impression c/w acute calculous cholecystitis. Now s/p lap kamila 3/23    Plan:  - f/u fever workup 3/24: UA negative. FU blood cx  - monitor epigastric pain   - diet: reg  - pain control prn   - dvt ppx  - f/u Am labs   - likely dc today    Red Team  [id number]

## 2022-03-25 ENCOUNTER — TRANSCRIPTION ENCOUNTER (OUTPATIENT)
Age: 52
End: 2022-03-25

## 2022-03-25 VITALS
TEMPERATURE: 98 F | DIASTOLIC BLOOD PRESSURE: 60 MMHG | RESPIRATION RATE: 18 BRPM | HEART RATE: 71 BPM | SYSTOLIC BLOOD PRESSURE: 103 MMHG | OXYGEN SATURATION: 97 %

## 2022-03-25 LAB
ALBUMIN SERPL ELPH-MCNC: 3.5 G/DL — SIGNIFICANT CHANGE UP (ref 3.3–5)
ALP SERPL-CCNC: 159 U/L — HIGH (ref 40–120)
ALT FLD-CCNC: 150 U/L — HIGH (ref 10–45)
ANION GAP SERPL CALC-SCNC: 13 MMOL/L — SIGNIFICANT CHANGE UP (ref 5–17)
AST SERPL-CCNC: 68 U/L — HIGH (ref 10–40)
BILIRUB DIRECT SERPL-MCNC: 0.3 MG/DL — SIGNIFICANT CHANGE UP (ref 0–0.3)
BILIRUB INDIRECT FLD-MCNC: 0.6 MG/DL — SIGNIFICANT CHANGE UP (ref 0.2–1)
BILIRUB SERPL-MCNC: 0.9 MG/DL — SIGNIFICANT CHANGE UP (ref 0.2–1.2)
BUN SERPL-MCNC: 10 MG/DL — SIGNIFICANT CHANGE UP (ref 7–23)
CALCIUM SERPL-MCNC: 8.3 MG/DL — LOW (ref 8.4–10.5)
CHLORIDE SERPL-SCNC: 98 MMOL/L — SIGNIFICANT CHANGE UP (ref 96–108)
CO2 SERPL-SCNC: 24 MMOL/L — SIGNIFICANT CHANGE UP (ref 22–31)
CREAT SERPL-MCNC: 0.7 MG/DL — SIGNIFICANT CHANGE UP (ref 0.5–1.3)
EGFR: 111 ML/MIN/1.73M2 — SIGNIFICANT CHANGE UP
GLUCOSE SERPL-MCNC: 106 MG/DL — HIGH (ref 70–99)
HCT VFR BLD CALC: 33.6 % — LOW (ref 39–50)
HGB BLD-MCNC: 11.6 G/DL — LOW (ref 13–17)
INR BLD: 1.28 RATIO — HIGH (ref 0.88–1.16)
MAGNESIUM SERPL-MCNC: 2.1 MG/DL — SIGNIFICANT CHANGE UP (ref 1.6–2.6)
MCHC RBC-ENTMCNC: 30.2 PG — SIGNIFICANT CHANGE UP (ref 27–34)
MCHC RBC-ENTMCNC: 34.5 GM/DL — SIGNIFICANT CHANGE UP (ref 32–36)
MCV RBC AUTO: 87.5 FL — SIGNIFICANT CHANGE UP (ref 80–100)
MELD SCORE WITH DIALYSIS: 23 POINTS — SIGNIFICANT CHANGE UP
MELD SCORE WITHOUT DIALYSIS: 9 POINTS — SIGNIFICANT CHANGE UP
NRBC # BLD: 0 /100 WBCS — SIGNIFICANT CHANGE UP (ref 0–0)
PHOSPHATE SERPL-MCNC: 2.4 MG/DL — LOW (ref 2.5–4.5)
PLATELET # BLD AUTO: 227 K/UL — SIGNIFICANT CHANGE UP (ref 150–400)
POTASSIUM SERPL-MCNC: 3.8 MMOL/L — SIGNIFICANT CHANGE UP (ref 3.5–5.3)
POTASSIUM SERPL-SCNC: 3.8 MMOL/L — SIGNIFICANT CHANGE UP (ref 3.5–5.3)
PROT SERPL-MCNC: 6.9 G/DL — SIGNIFICANT CHANGE UP (ref 6–8.3)
PROTHROM AB SERPL-ACNC: 14.7 SEC — HIGH (ref 10.5–13.4)
RBC # BLD: 3.84 M/UL — LOW (ref 4.2–5.8)
RBC # FLD: 12.8 % — SIGNIFICANT CHANGE UP (ref 10.3–14.5)
SODIUM SERPL-SCNC: 135 MMOL/L — SIGNIFICANT CHANGE UP (ref 135–145)
WBC # BLD: 8.78 K/UL — SIGNIFICANT CHANGE UP (ref 3.8–10.5)
WBC # FLD AUTO: 8.78 K/UL — SIGNIFICANT CHANGE UP (ref 3.8–10.5)

## 2022-03-25 PROCEDURE — 87086 URINE CULTURE/COLONY COUNT: CPT

## 2022-03-25 PROCEDURE — 83605 ASSAY OF LACTIC ACID: CPT

## 2022-03-25 PROCEDURE — 82947 ASSAY GLUCOSE BLOOD QUANT: CPT

## 2022-03-25 PROCEDURE — U0005: CPT

## 2022-03-25 PROCEDURE — C1889: CPT

## 2022-03-25 PROCEDURE — 86901 BLOOD TYPING SEROLOGIC RH(D): CPT

## 2022-03-25 PROCEDURE — 99285 EMERGENCY DEPT VISIT HI MDM: CPT | Mod: 25

## 2022-03-25 PROCEDURE — 81003 URINALYSIS AUTO W/O SCOPE: CPT

## 2022-03-25 PROCEDURE — 80048 BASIC METABOLIC PNL TOTAL CA: CPT

## 2022-03-25 PROCEDURE — 85018 HEMOGLOBIN: CPT

## 2022-03-25 PROCEDURE — 84295 ASSAY OF SERUM SODIUM: CPT

## 2022-03-25 PROCEDURE — 96374 THER/PROPH/DIAG INJ IV PUSH: CPT

## 2022-03-25 PROCEDURE — 82803 BLOOD GASES ANY COMBINATION: CPT

## 2022-03-25 PROCEDURE — 36415 COLL VENOUS BLD VENIPUNCTURE: CPT

## 2022-03-25 PROCEDURE — 85027 COMPLETE CBC AUTOMATED: CPT

## 2022-03-25 PROCEDURE — 80053 COMPREHEN METABOLIC PANEL: CPT

## 2022-03-25 PROCEDURE — 76705 ECHO EXAM OF ABDOMEN: CPT

## 2022-03-25 PROCEDURE — 87040 BLOOD CULTURE FOR BACTERIA: CPT

## 2022-03-25 PROCEDURE — 85014 HEMATOCRIT: CPT

## 2022-03-25 PROCEDURE — 85730 THROMBOPLASTIN TIME PARTIAL: CPT

## 2022-03-25 PROCEDURE — U0003: CPT

## 2022-03-25 PROCEDURE — 82565 ASSAY OF CREATININE: CPT

## 2022-03-25 PROCEDURE — 82330 ASSAY OF CALCIUM: CPT

## 2022-03-25 PROCEDURE — 81001 URINALYSIS AUTO W/SCOPE: CPT

## 2022-03-25 PROCEDURE — 71045 X-RAY EXAM CHEST 1 VIEW: CPT

## 2022-03-25 PROCEDURE — C9399: CPT

## 2022-03-25 PROCEDURE — 84100 ASSAY OF PHOSPHORUS: CPT

## 2022-03-25 PROCEDURE — 88304 TISSUE EXAM BY PATHOLOGIST: CPT

## 2022-03-25 PROCEDURE — 83690 ASSAY OF LIPASE: CPT

## 2022-03-25 PROCEDURE — 85025 COMPLETE CBC W/AUTO DIFF WBC: CPT

## 2022-03-25 PROCEDURE — 96376 TX/PRO/DX INJ SAME DRUG ADON: CPT

## 2022-03-25 PROCEDURE — 82435 ASSAY OF BLOOD CHLORIDE: CPT

## 2022-03-25 PROCEDURE — 84132 ASSAY OF SERUM POTASSIUM: CPT

## 2022-03-25 PROCEDURE — 86900 BLOOD TYPING SEROLOGIC ABO: CPT

## 2022-03-25 PROCEDURE — 83735 ASSAY OF MAGNESIUM: CPT

## 2022-03-25 PROCEDURE — 85610 PROTHROMBIN TIME: CPT

## 2022-03-25 PROCEDURE — 80076 HEPATIC FUNCTION PANEL: CPT

## 2022-03-25 PROCEDURE — 86850 RBC ANTIBODY SCREEN: CPT

## 2022-03-25 RX ORDER — OXYCODONE HYDROCHLORIDE 5 MG/1
1 TABLET ORAL
Qty: 12 | Refills: 0
Start: 2022-03-25 | End: 2022-03-27

## 2022-03-25 RX ORDER — POTASSIUM CHLORIDE 20 MEQ
20 PACKET (EA) ORAL ONCE
Refills: 0 | Status: COMPLETED | OUTPATIENT
Start: 2022-03-25 | End: 2022-03-25

## 2022-03-25 RX ORDER — POLYETHYLENE GLYCOL 3350 17 G/17G
17 POWDER, FOR SOLUTION ORAL
Qty: 0 | Refills: 0 | DISCHARGE
Start: 2022-03-25

## 2022-03-25 RX ORDER — SENNA PLUS 8.6 MG/1
2 TABLET ORAL AT BEDTIME
Refills: 0 | Status: DISCONTINUED | OUTPATIENT
Start: 2022-03-25 | End: 2022-03-25

## 2022-03-25 RX ORDER — SENNA PLUS 8.6 MG/1
2 TABLET ORAL
Qty: 0 | Refills: 0 | DISCHARGE
Start: 2022-03-25

## 2022-03-25 RX ORDER — POLYETHYLENE GLYCOL 3350 17 G/17G
17 POWDER, FOR SOLUTION ORAL ONCE
Refills: 0 | Status: DISCONTINUED | OUTPATIENT
Start: 2022-03-25 | End: 2022-03-25

## 2022-03-25 RX ADMIN — PIPERACILLIN AND TAZOBACTAM 25 GRAM(S): 4; .5 INJECTION, POWDER, LYOPHILIZED, FOR SOLUTION INTRAVENOUS at 10:12

## 2022-03-25 RX ADMIN — Medication 20 MILLIEQUIVALENT(S): at 10:12

## 2022-03-25 RX ADMIN — Medication 62.5 MILLIMOLE(S): at 10:16

## 2022-03-25 RX ADMIN — PIPERACILLIN AND TAZOBACTAM 25 GRAM(S): 4; .5 INJECTION, POWDER, LYOPHILIZED, FOR SOLUTION INTRAVENOUS at 01:38

## 2022-03-25 NOTE — DISCHARGE NOTE NURSING/CASE MANAGEMENT/SOCIAL WORK - PATIENT PORTAL LINK FT
You can access the FollowMyHealth Patient Portal offered by [facility] by registering at the following website: [email or web address]. By joining [employer]’s FollowMyHealth portal, you will also be able to view your health information using other applications (apps) compatible with our system.

## 2022-03-25 NOTE — PROGRESS NOTE ADULT - SUBJECTIVE AND OBJECTIVE BOX
RED Surgery Progress Note      SUBJECTIVE: Patient seen and examined at bedside with surgical team. Febrile overnight 100.7.     OBJECTIVE:    Vital Signs Last 24 Hrs  T(C): 37.3 (24 Mar 2022 21:02), Max: 38.7 (24 Mar 2022 03:09)  T(F): 99.2 (24 Mar 2022 21:02), Max: 101.7 (24 Mar 2022 03:09)  HR: 76 (24 Mar 2022 21:02) (66 - 77)  BP: 100/55 (24 Mar 2022 21:02) (98/55 - 111/57)  BP(mean): --  RR: 18 (24 Mar 2022 21:02) (18 - 18)  SpO2: 95% (24 Mar 2022 21:) (94% - 95%)I&O's Detail    23 Mar 2022 07:01  -  24 Mar 2022 07:00  --------------------------------------------------------  IN:    Lactated Ringers: 1375 mL    Oral Fluid: 380 mL  Total IN: 1755 mL    OUT:    Voided (mL): 2200 mL  Total OUT: 2200 mL    Total NET: -445 mL      24 Mar 2022 07:01  -  25 Mar 2022 00:56  --------------------------------------------------------  IN:    Oral Fluid: 860 mL  Total IN: 860 mL    OUT:    Voided (mL): 800 mL  Total OUT: 800 mL    Total NET: 60 mL      MEDICATIONS  (STANDING):  enoxaparin Injectable 40 milliGRAM(s) SubCutaneous every 24 hours  influenza   Vaccine 0.5 milliLiter(s) IntraMuscular once  piperacillin/tazobactam IVPB.. 3.375 Gram(s) IV Intermittent every 8 hours    MEDICATIONS  (PRN):  acetaminophen     Tablet .. 650 milliGRAM(s) Oral every 6 hours PRN Mild Pain (1 - 3)  oxyCODONE    IR 5 milliGRAM(s) Oral every 8 hours PRN Severe Pain (7 - 10)  oxyCODONE    IR 2.5 milliGRAM(s) Oral every 6 hours PRN Moderate Pain (4 - 6)      PHYSICAL EXAM:  Constitutional: A&Ox3, NAD  Respiratory: Unlabored breathing  Abdomen: Soft, nondistended, NTTP. No rebound or guarding.  Extremities: WWP, KOENIG spontaneously    LABS:                        11.6   9.52  )-----------( 205      ( 24 Mar 2022 12:48 )             34.6     03-24    134<L>  |  98  |  12  ----------------------------<  126<H>  3.8   |  25  |  0.70    Ca    7.7<L>      24 Mar 2022 04:06  Phos  2.1     -24  Mg     2.2     -24    TPro  6.5  /  Alb  3.3  /  TBili  0.6  /  DBili  0.2  /  AST  77<H>  /  ALT  121<H>  /  AlkPhos  104  -24      LIVER FUNCTIONS - ( 24 Mar 2022 04:06 )  Alb: 3.3 g/dL / Pro: 6.5 g/dL / ALK PHOS: 104 U/L / ALT: 121 U/L / AST: 77 U/L / GGT: x           Urinalysis Basic - ( 24 Mar 2022 07:15 )    Color: Yellow / Appearance: Clear / S.024 / pH: x  Gluc: x / Ketone: Moderate  / Bili: Negative / Urobili: 2 mg/dL   Blood: x / Protein: Trace / Nitrite: Negative   Leuk Esterase: Negative / RBC: 2 /hpf / WBC 1 /HPF   Sq Epi: x / Non Sq Epi: 0 /hpf / Bacteria: Negative           RED Surgery Progress Note      SUBJECTIVE: Patient seen and examined at bedside with surgical team. Febrile overnight 100.7. This morning, patient is feeling well. Tolerating diet and having bowel function. Plan to keep for additional day due to fever. Discussed with patient.     OBJECTIVE:    Vital Signs Last 24 Hrs  T(C): 37.3 (24 Mar 2022 21:02), Max: 38.7 (24 Mar 2022 03:09)  T(F): 99.2 (24 Mar 2022 21:02), Max: 101.7 (24 Mar 2022 03:09)  HR: 76 (24 Mar 2022 21:02) (66 - 77)  BP: 100/55 (24 Mar 2022 21:02) (98/55 - 111/57)  BP(mean): --  RR: 18 (24 Mar 2022 21:02) (18 - 18)  SpO2: 95% (24 Mar 2022 21:02) (94% - 95%)I&O's Detail    23 Mar 2022 07:01  -  24 Mar 2022 07:00  --------------------------------------------------------  IN:    Lactated Ringers: 1375 mL    Oral Fluid: 380 mL  Total IN: 1755 mL    OUT:    Voided (mL): 2200 mL  Total OUT: 2200 mL    Total NET: -445 mL      24 Mar 2022 07:01  -  25 Mar 2022 00:56  --------------------------------------------------------  IN:    Oral Fluid: 860 mL  Total IN: 860 mL    OUT:    Voided (mL): 800 mL  Total OUT: 800 mL    Total NET: 60 mL      MEDICATIONS  (STANDING):  enoxaparin Injectable 40 milliGRAM(s) SubCutaneous every 24 hours  influenza   Vaccine 0.5 milliLiter(s) IntraMuscular once  piperacillin/tazobactam IVPB.. 3.375 Gram(s) IV Intermittent every 8 hours    MEDICATIONS  (PRN):  acetaminophen     Tablet .. 650 milliGRAM(s) Oral every 6 hours PRN Mild Pain (1 - 3)  oxyCODONE    IR 5 milliGRAM(s) Oral every 8 hours PRN Severe Pain (7 - 10)  oxyCODONE    IR 2.5 milliGRAM(s) Oral every 6 hours PRN Moderate Pain (4 - 6)      PHYSICAL EXAM:  Constitutional: A&Ox3, NAD  Respiratory: Unlabored breathing  Abdomen: Soft, nondistended, NTTP. No rebound or guarding. Incisions are c/d/i  Extremities: WWP, KOENIG spontaneously    LABS:                        11.6   9.52  )-----------( 205      ( 24 Mar 2022 12:48 )             34.6     03-24    134<L>  |  98  |  12  ----------------------------<  126<H>  3.8   |  25  |  0.70    Ca    7.7<L>      24 Mar 2022 04:06  Phos  2.1     -24  Mg     2.2     -24    TPro  6.5  /  Alb  3.3  /  TBili  0.6  /  DBili  0.2  /  AST  77<H>  /  ALT  121<H>  /  AlkPhos  104  03-24      LIVER FUNCTIONS - ( 24 Mar 2022 04:06 )  Alb: 3.3 g/dL / Pro: 6.5 g/dL / ALK PHOS: 104 U/L / ALT: 121 U/L / AST: 77 U/L / GGT: x           Urinalysis Basic - ( 24 Mar 2022 07:15 )    Color: Yellow / Appearance: Clear / S.024 / pH: x  Gluc: x / Ketone: Moderate  / Bili: Negative / Urobili: 2 mg/dL   Blood: x / Protein: Trace / Nitrite: Negative   Leuk Esterase: Negative / RBC: 2 /hpf / WBC 1 /HPF   Sq Epi: x / Non Sq Epi: 0 /hpf / Bacteria: Negative

## 2022-03-25 NOTE — PROGRESS NOTE ADULT - ASSESSMENT
Assessment:  51y Male with no PMH, PSH of right inguinal hernia repair followed by lap b/l hernia repair (TEPP) following year by Dr Regan. Patient here with RUQ pain, RUQ tenderness exam. No fever of leukocytosis. LFT normal. Imaging shows GS with pericholecystic fluid, CBD dilated (which was unchanged compared to last year). Overall impression c/w acute calculous cholecystitis. Now s/p lap kamila 3/23    Plan:  - f/u fever workup 3/24: UA and CXR negative. FU blood cx  - monitor epigastric pain   - diet: reg  - pain control prn   - dvt ppx  - f/u Am labs   - likely dc today    Red Team  [id number] Assessment:  51y Male with no PMH, PSH of right inguinal hernia repair followed by lap b/l hernia repair (TEPP) following year by Dr Regan. Patient here with RUQ pain, RUQ tenderness exam. No fever of leukocytosis. LFT normal. Imaging shows GS with pericholecystic fluid, CBD dilated (which was unchanged compared to last year). Overall impression c/w acute calculous cholecystitis. Now s/p lap kamila 3/23    Plan:  - f/u fever workup 3/24: UA and CXR negative. FU blood cx  - Monitor for fevers  - monitor epigastric pain   - diet: reg  - pain control prn   - dvt ppx  - f/u Am labs   - possible DC tomorrow 3/26    Red Team  [id number] Assessment:  51y Male with no PMH, PSH of right inguinal hernia repair followed by lap b/l hernia repair (TEPP) following year by Dr Regan. Patient here with RUQ pain, RUQ tenderness exam. No fever of leukocytosis. LFT normal. Imaging shows GS with pericholecystic fluid, CBD dilated (which was unchanged compared to last year). Overall impression c/w acute calculous cholecystitis. Now s/p lap kamila 3/23    Plan:  - f/u fever workup 3/24: UA and CXR negative. FU blood cx  - Monitor for fevers  - monitor epigastric pain   - diet: reg  - pain control prn   - dvt ppx  - f/u Am labs   - possible DC this pm vs tomorrow 3/26 with po abx     Red Team  [id number]

## 2022-03-25 NOTE — DISCHARGE NOTE NURSING/CASE MANAGEMENT/SOCIAL WORK - NSDCPEFALRISK_GEN_ALL_CORE
For information on Fall & Injury Prevention, visit: [email or web address] OR  [email or web address] OR  https://www.cdc.gov/steadi/patient.html

## 2022-03-25 NOTE — PROGRESS NOTE ADULT - ATTENDING COMMENTS
pt seen and examined  agree with note above  pod 2 s/p lap kamila for acute on chronic cholecystitis  pt with tmax 100.2 last 24hrs, currently afebrile, VSS  resting in bed, NAD  tolerating diet  soft, nd, nt no r/g  wounds c/d/i X 4  f/u labs in am  cont iv abx for now  oob to ambulate  cont low fat diet  ok for dc home with po abx this pm  d/w pt & family @ bedside in detail .
date of service 3/22/22    pt seen and examined  pt chart and imaging reviewed in detail  agree with note above  51M with acute kamila and hx of gallstones  RUQ pain mild improved  adv to [facility]  cont iv abx  f/u labs in am  placed on OR schedule for lap kamila possible open on wed 3/23  d/w pt / family r/b/a & agree with plan   consent signed in chart .
pt seen and examined  agree with note above  pod 1 s/p lap kamila for acute on chronic cholecystitis  pt with fever O/N tmax 101.7 currently afebrile, VSS  resting in bed, NAD  tolerating diet  soft, nd, nt no r/g  wounds c/d/i X 4  f/u labs in am  cont iv abx for now  oob to ambulate  cont low fat diet  if remains afebrile consider dc home with po abx in am   d/w pt & family @ bedside in detail

## 2022-03-29 ENCOUNTER — NON-APPOINTMENT (OUTPATIENT)
Age: 52
End: 2022-03-29

## 2022-03-29 LAB
CULTURE RESULTS: SIGNIFICANT CHANGE UP
CULTURE RESULTS: SIGNIFICANT CHANGE UP
SPECIMEN SOURCE: SIGNIFICANT CHANGE UP
SPECIMEN SOURCE: SIGNIFICANT CHANGE UP

## 2022-03-31 LAB — SURGICAL PATHOLOGY STUDY: SIGNIFICANT CHANGE UP

## 2022-04-07 ENCOUNTER — TRANSCRIPTION ENCOUNTER (OUTPATIENT)
Age: 52
End: 2022-04-07

## 2023-09-06 ENCOUNTER — APPOINTMENT (OUTPATIENT)
Dept: INTERNAL MEDICINE | Facility: CLINIC | Age: 53
End: 2023-09-06

## 2023-10-03 ENCOUNTER — APPOINTMENT (OUTPATIENT)
Age: 53
End: 2023-10-03
Payer: COMMERCIAL

## 2023-10-03 ENCOUNTER — OUTPATIENT (OUTPATIENT)
Dept: OUTPATIENT SERVICES | Facility: HOSPITAL | Age: 53
LOS: 1 days | End: 2023-10-03
Payer: SELF-PAY

## 2023-10-03 ENCOUNTER — MED ADMIN CHARGE (OUTPATIENT)
Age: 53
End: 2023-10-03

## 2023-10-03 VITALS
SYSTOLIC BLOOD PRESSURE: 114 MMHG | WEIGHT: 175 LBS | HEIGHT: 67 IN | BODY MASS INDEX: 27.47 KG/M2 | OXYGEN SATURATION: 97 % | HEART RATE: 64 BPM | DIASTOLIC BLOOD PRESSURE: 70 MMHG

## 2023-10-03 DIAGNOSIS — I10 ESSENTIAL (PRIMARY) HYPERTENSION: ICD-10-CM

## 2023-10-03 DIAGNOSIS — R21 RASH AND OTHER NONSPECIFIC SKIN ERUPTION: ICD-10-CM

## 2023-10-03 DIAGNOSIS — B49 UNSPECIFIED MYCOSIS: ICD-10-CM

## 2023-10-03 DIAGNOSIS — Z98.890 OTHER SPECIFIED POSTPROCEDURAL STATES: Chronic | ICD-10-CM

## 2023-10-03 DIAGNOSIS — L80 VITILIGO: ICD-10-CM

## 2023-10-03 PROCEDURE — 80053 COMPREHEN METABOLIC PANEL: CPT

## 2023-10-03 PROCEDURE — ZZZZZ: CPT

## 2023-10-03 PROCEDURE — G0008: CPT

## 2023-10-03 PROCEDURE — 80061 LIPID PANEL: CPT

## 2023-10-03 PROCEDURE — 83036 HEMOGLOBIN GLYCOSYLATED A1C: CPT

## 2023-10-03 PROCEDURE — 84443 ASSAY THYROID STIM HORMONE: CPT

## 2023-10-03 PROCEDURE — 85027 COMPLETE CBC AUTOMATED: CPT

## 2023-10-03 PROCEDURE — 36415 COLL VENOUS BLD VENIPUNCTURE: CPT

## 2023-10-03 RX ORDER — NYSTATIN 100000 [USP'U]/G
100000 CREAM TOPICAL TWICE DAILY
Qty: 1 | Refills: 1 | Status: ACTIVE | COMMUNITY
Start: 2023-10-03 | End: 1900-01-01

## 2023-10-05 LAB
ALBUMIN SERPL ELPH-MCNC: 5.1 G/DL
ALP BLD-CCNC: 76 U/L
ALT SERPL-CCNC: 30 U/L
ANION GAP SERPL CALC-SCNC: 12 MMOL/L
AST SERPL-CCNC: 21 U/L
BILIRUB SERPL-MCNC: 0.5 MG/DL
BUN SERPL-MCNC: 17 MG/DL
CALCIUM SERPL-MCNC: 9.4 MG/DL
CHLORIDE SERPL-SCNC: 101 MMOL/L
CHOLEST SERPL-MCNC: 196 MG/DL
CO2 SERPL-SCNC: 24 MMOL/L
CREAT SERPL-MCNC: 0.8 MG/DL
EGFR: 106 ML/MIN/1.73M2
ESTIMATED AVERAGE GLUCOSE: 120 MG/DL
GLUCOSE SERPL-MCNC: 91 MG/DL
HBA1C MFR BLD HPLC: 5.8 %
HCT VFR BLD CALC: 42.3 %
HDLC SERPL-MCNC: 63 MG/DL
HGB BLD-MCNC: 14.4 G/DL
LDLC SERPL CALC-MCNC: 121 MG/DL
MCHC RBC-ENTMCNC: 30.4 PG
MCHC RBC-ENTMCNC: 34 GM/DL
MCV RBC AUTO: 89.2 FL
NONHDLC SERPL-MCNC: 133 MG/DL
PLATELET # BLD AUTO: 221 K/UL
POTASSIUM SERPL-SCNC: 3.9 MMOL/L
PROT SERPL-MCNC: 7.8 G/DL
RBC # BLD: 4.74 M/UL
RBC # FLD: 12.9 %
SODIUM SERPL-SCNC: 138 MMOL/L
TRIGL SERPL-MCNC: 66 MG/DL
TSH SERPL-ACNC: 1.89 UIU/ML
WBC # FLD AUTO: 6.9 K/UL

## 2023-10-13 DIAGNOSIS — R21 RASH AND OTHER NONSPECIFIC SKIN ERUPTION: ICD-10-CM

## 2023-10-13 DIAGNOSIS — Z23 ENCOUNTER FOR IMMUNIZATION: ICD-10-CM

## 2023-10-13 DIAGNOSIS — L80 VITILIGO: ICD-10-CM

## 2023-10-13 DIAGNOSIS — B49 UNSPECIFIED MYCOSIS: ICD-10-CM

## 2024-07-31 NOTE — DISCHARGE NOTE NURSING/CASE MANAGEMENT/SOCIAL WORK - DATE OF LAST VACCINATION
What Type Of Note Output Would You Prefer (Optional)?: Standard Output Hpi Title: Evaluation of Skin Lesions How Severe Are Your Spot(S)?: mild Have Your Spot(S) Been Treated In The Past?: has not been treated 20-Dec-2021

## 2024-08-07 NOTE — ED ADULT NURSE NOTE - PAIN RATING/NUMBER SCALE (0-10): ACTIVITY
Goal Outcome Evaluation:           Progress: no change  Outcome Evaluation: Patient has been resting in the bed throughout the shift, PRN medication has been given for pain, wound care has been completed per orders.                                0

## 2024-11-12 ENCOUNTER — NON-APPOINTMENT (OUTPATIENT)
Age: 54
End: 2024-11-12

## 2024-11-12 ENCOUNTER — OUTPATIENT (OUTPATIENT)
Dept: OUTPATIENT SERVICES | Facility: HOSPITAL | Age: 54
LOS: 1 days | End: 2024-11-12
Payer: SELF-PAY

## 2024-11-12 ENCOUNTER — APPOINTMENT (OUTPATIENT)
Dept: INTERNAL MEDICINE | Facility: CLINIC | Age: 54
End: 2024-11-12

## 2024-11-12 VITALS
OXYGEN SATURATION: 98 % | WEIGHT: 172 LBS | BODY MASS INDEX: 27 KG/M2 | HEART RATE: 57 BPM | HEIGHT: 67 IN | SYSTOLIC BLOOD PRESSURE: 110 MMHG | DIASTOLIC BLOOD PRESSURE: 70 MMHG

## 2024-11-12 DIAGNOSIS — R21 RASH AND OTHER NONSPECIFIC SKIN ERUPTION: ICD-10-CM

## 2024-11-12 DIAGNOSIS — Z87.19 PERSONAL HISTORY OF OTHER DISEASES OF THE DIGESTIVE SYSTEM: ICD-10-CM

## 2024-11-12 DIAGNOSIS — Z00.00 ENCOUNTER FOR GENERAL ADULT MEDICAL EXAMINATION W/OUT ABNORMAL FINDINGS: ICD-10-CM

## 2024-11-12 DIAGNOSIS — R07.9 CHEST PAIN, UNSPECIFIED: ICD-10-CM

## 2024-11-12 DIAGNOSIS — I10 ESSENTIAL (PRIMARY) HYPERTENSION: ICD-10-CM

## 2024-11-12 DIAGNOSIS — Z60.3 ACCULTURATION DIFFICULTY: ICD-10-CM

## 2024-11-12 DIAGNOSIS — Z98.890 OTHER SPECIFIED POSTPROCEDURAL STATES: Chronic | ICD-10-CM

## 2024-11-12 PROCEDURE — 84153 ASSAY OF PSA TOTAL: CPT

## 2024-11-12 PROCEDURE — 80061 LIPID PANEL: CPT

## 2024-11-12 PROCEDURE — 85027 COMPLETE CBC AUTOMATED: CPT

## 2024-11-12 PROCEDURE — 90656 IIV3 VACC NO PRSV 0.5 ML IM: CPT

## 2024-11-12 PROCEDURE — T1013: CPT

## 2024-11-12 PROCEDURE — 90750 HZV VACC RECOMBINANT IM: CPT

## 2024-11-12 PROCEDURE — 99203 OFFICE O/P NEW LOW 30 MIN: CPT | Mod: GE,25

## 2024-11-12 PROCEDURE — 84154 ASSAY OF PSA FREE: CPT

## 2024-11-12 PROCEDURE — 80053 COMPREHEN METABOLIC PANEL: CPT

## 2024-11-12 PROCEDURE — 90471 IMMUNIZATION ADMIN: CPT

## 2024-11-12 PROCEDURE — G0008: CPT

## 2024-11-12 PROCEDURE — 84443 ASSAY THYROID STIM HORMONE: CPT

## 2024-11-12 PROCEDURE — G0463: CPT | Mod: 25

## 2024-11-12 PROCEDURE — 83036 HEMOGLOBIN GLYCOSYLATED A1C: CPT

## 2024-11-12 RX ORDER — HYDROCORTISONE 10 MG/G
1 CREAM TOPICAL TWICE DAILY
Qty: 1 | Refills: 0 | Status: ACTIVE | COMMUNITY
Start: 2024-11-12 | End: 1900-01-01

## 2024-11-12 SDOH — SOCIAL STABILITY - SOCIAL INSECURITY: ACCULTURATION DIFFICULTY: Z60.3

## 2024-11-13 LAB
ALBUMIN SERPL ELPH-MCNC: 4.8 G/DL
ALP BLD-CCNC: 82 U/L
ALT SERPL-CCNC: 23 U/L
ANION GAP SERPL CALC-SCNC: 15 MMOL/L
AST SERPL-CCNC: 18 U/L
BILIRUB SERPL-MCNC: 0.5 MG/DL
BUN SERPL-MCNC: 18 MG/DL
CALCIUM SERPL-MCNC: 9.9 MG/DL
CHLORIDE SERPL-SCNC: 100 MMOL/L
CHOLEST SERPL-MCNC: 231 MG/DL
CO2 SERPL-SCNC: 22 MMOL/L
CREAT SERPL-MCNC: 0.74 MG/DL
EGFR: 107 ML/MIN/1.73M2
ESTIMATED AVERAGE GLUCOSE: 120 MG/DL
GLUCOSE SERPL-MCNC: 88 MG/DL
HBA1C MFR BLD HPLC: 5.8 %
HCT VFR BLD CALC: 42 %
HDLC SERPL-MCNC: 72 MG/DL
HGB BLD-MCNC: 14.2 G/DL
LDLC SERPL CALC-MCNC: 146 MG/DL
MCHC RBC-ENTMCNC: 30 PG
MCHC RBC-ENTMCNC: 33.8 G/DL
MCV RBC AUTO: 88.8 FL
NONHDLC SERPL-MCNC: 159 MG/DL
PLATELET # BLD AUTO: 229 K/UL
POTASSIUM SERPL-SCNC: 3.9 MMOL/L
PROT SERPL-MCNC: 8.1 G/DL
PSA FREE FLD-MCNC: 22 %
PSA FREE SERPL-MCNC: 0.43 NG/ML
PSA SERPL-MCNC: 1.94 NG/ML
RBC # BLD: 4.73 M/UL
RBC # FLD: 12.8 %
SODIUM SERPL-SCNC: 136 MMOL/L
TRIGL SERPL-MCNC: 75 MG/DL
TSH SERPL-ACNC: 2.03 UIU/ML
WBC # FLD AUTO: 6.32 K/UL

## 2024-12-27 NOTE — ED ADULT TRIAGE NOTE - PAIN RATING/NUMBER SCALE (0-10): ACTIVITY
Patti Hale RN ([facility]) has some BP concerns; transferred call to ANTHONY Castillo.   6

## 2025-01-08 ENCOUNTER — OUTPATIENT (OUTPATIENT)
Dept: OUTPATIENT SERVICES | Facility: HOSPITAL | Age: 55
LOS: 1 days | End: 2025-01-08
Payer: SELF-PAY

## 2025-01-08 ENCOUNTER — NON-APPOINTMENT (OUTPATIENT)
Age: 55
End: 2025-01-08

## 2025-01-08 ENCOUNTER — APPOINTMENT (OUTPATIENT)
Dept: CARDIOLOGY | Facility: HOSPITAL | Age: 55
End: 2025-01-08

## 2025-01-08 VITALS
BODY MASS INDEX: 26.94 KG/M2 | OXYGEN SATURATION: 98 % | HEART RATE: 57 BPM | DIASTOLIC BLOOD PRESSURE: 65 MMHG | WEIGHT: 172 LBS | SYSTOLIC BLOOD PRESSURE: 104 MMHG

## 2025-01-08 DIAGNOSIS — R07.9 CHEST PAIN, UNSPECIFIED: ICD-10-CM

## 2025-01-08 DIAGNOSIS — I25.10 ATHEROSCLEROTIC HEART DISEASE OF NATIVE CORONARY ARTERY WITHOUT ANGINA PECTORIS: ICD-10-CM

## 2025-01-08 DIAGNOSIS — Z98.890 OTHER SPECIFIED POSTPROCEDURAL STATES: Chronic | ICD-10-CM

## 2025-01-08 PROCEDURE — G0463: CPT

## 2025-01-08 PROCEDURE — 93005 ELECTROCARDIOGRAM TRACING: CPT

## 2025-01-09 DIAGNOSIS — R07.9 CHEST PAIN, UNSPECIFIED: ICD-10-CM

## 2025-01-21 ENCOUNTER — APPOINTMENT (OUTPATIENT)
Dept: INTERNAL MEDICINE | Facility: CLINIC | Age: 55
End: 2025-01-21

## 2025-01-22 ENCOUNTER — APPOINTMENT (OUTPATIENT)
Dept: INTERNAL MEDICINE | Facility: CLINIC | Age: 55
End: 2025-01-22
Payer: COMMERCIAL

## 2025-01-22 VITALS
OXYGEN SATURATION: 98 % | DIASTOLIC BLOOD PRESSURE: 64 MMHG | BODY MASS INDEX: 26.84 KG/M2 | SYSTOLIC BLOOD PRESSURE: 100 MMHG | WEIGHT: 171 LBS | HEIGHT: 67 IN | HEART RATE: 51 BPM

## 2025-01-22 DIAGNOSIS — L80 VITILIGO: ICD-10-CM

## 2025-01-22 DIAGNOSIS — R07.9 CHEST PAIN, UNSPECIFIED: ICD-10-CM

## 2025-01-22 DIAGNOSIS — Z87.19 PERSONAL HISTORY OF OTHER DISEASES OF THE DIGESTIVE SYSTEM: ICD-10-CM

## 2025-01-22 PROCEDURE — 99213 OFFICE O/P EST LOW 20 MIN: CPT | Mod: GE,25

## 2025-01-29 ENCOUNTER — OUTPATIENT (OUTPATIENT)
Dept: OUTPATIENT SERVICES | Facility: HOSPITAL | Age: 55
LOS: 1 days | End: 2025-01-29
Payer: SELF-PAY

## 2025-01-29 ENCOUNTER — APPOINTMENT (OUTPATIENT)
Dept: CT IMAGING | Facility: IMAGING CENTER | Age: 55
End: 2025-01-29
Payer: SELF-PAY

## 2025-01-29 DIAGNOSIS — R07.9 CHEST PAIN, UNSPECIFIED: ICD-10-CM

## 2025-01-29 DIAGNOSIS — Z98.890 OTHER SPECIFIED POSTPROCEDURAL STATES: Chronic | ICD-10-CM

## 2025-01-29 PROCEDURE — 75574 CT ANGIO HRT W/3D IMAGE: CPT | Mod: 26

## 2025-01-29 PROCEDURE — 75574 CT ANGIO HRT W/3D IMAGE: CPT

## (undated) DEVICE — DRSG OPSITE 2.5 X 2"

## (undated) DEVICE — GLV 7.5 PROTEXIS (WHITE)

## (undated) DEVICE — GLV 8.5 PROTEXIS (WHITE)

## (undated) DEVICE — INSUFFLATION NDL COVIDIEN STEP 14G SHORT FOR STEP/VERSASTEP

## (undated) DEVICE — SYR LUER LOK 30CC

## (undated) DEVICE — BLADE SCALPEL SAFETYLOCK #10

## (undated) DEVICE — VALVE YELLOW PORT SEAL PLUS 5MM

## (undated) DEVICE — SYR LUER LOK 20CC

## (undated) DEVICE — INSUFFLATION NDL COVIDIEN STEP 14G FOR STEP/VERSASTEP

## (undated) DEVICE — SOL IRR POUR H2O 250ML

## (undated) DEVICE — DISSECTOR ENDO PEANUT 5MM

## (undated) DEVICE — ELCTR CORD FOOTSWITCH 1PLR LAPSCP 10FT

## (undated) DEVICE — SHEARS HARMONIC ACE 5MM X 36CM CURVED TIP

## (undated) DEVICE — DRSG STERISTRIPS 0.5 X 4"

## (undated) DEVICE — TUBING TRUWAVE PRESSURE MALE/FEMALE 72"

## (undated) DEVICE — TUBING IRRIGATION DAVOL SYSTEM X STREAM

## (undated) DEVICE — GOWN TRIMAX LG

## (undated) DEVICE — VENODYNE/SCD SLEEVE CALF LARGE

## (undated) DEVICE — SUT BIOSYN 4-0 18" P-12

## (undated) DEVICE — BLADE SCALPEL SAFETYLOCK #15

## (undated) DEVICE — D HELP - CLEARVIEW CLEARIFY SYSTEM

## (undated) DEVICE — ENDOCATCH 10MM SPECIMEN POUCH

## (undated) DEVICE — WARMING BLANKET UPPER ADULT

## (undated) DEVICE — SUT POLYSORB 0 36" GU-46

## (undated) DEVICE — GLV 7 PROTEXIS (WHITE)

## (undated) DEVICE — SHEARS COVIDIEN ENDO SHEAR 5MM X 31CM W UNIPOLAR CAUTERY

## (undated) DEVICE — DRAPE TOWEL BLUE 17" X 24"

## (undated) DEVICE — MEDICATION LABELS W MARKER

## (undated) DEVICE — CATH IV SAFE INSYTE 14G X 1.75" (ORANGE)

## (undated) DEVICE — APPLICATOR FOR ARISTA XL 38CM

## (undated) DEVICE — GLV 8 PROTEXIS (WHITE)

## (undated) DEVICE — SUT PDS II 0 18" ENDOLOOP LIGATURE

## (undated) DEVICE — PREP CHLORAPREP HI-LITE ORANGE 26ML

## (undated) DEVICE — TROCAR COVIDIEN VERSAPORT BLADELESS OPTICAL 5MM STANDARD

## (undated) DEVICE — STOPCOCK 3 WAY W TUBE 35"

## (undated) DEVICE — GLV 8 PROTEXIS (BLUE)

## (undated) DEVICE — GLV 6.5 PROTEXIS (WHITE)

## (undated) DEVICE — SOL IRR POUR NS 0.9% 500ML

## (undated) DEVICE — POSITIONER FOAM EGG CRATE ULNAR 2PCS (PINK)

## (undated) DEVICE — LIGASURE MARYLAND 37CM

## (undated) DEVICE — TROCAR APPLIED MEDICAL KII BALLOON BLUNT TIP 12MM X 100MM

## (undated) DEVICE — DRAPE C ARM UNIVERSAL

## (undated) DEVICE — PACK ADVANCED LAPAROSCOPIC NS

## (undated) DEVICE — DRAPE INSTRUMENT POUCH 6.75" X 11"

## (undated) DEVICE — MARKING PEN W RULER

## (undated) DEVICE — DRAPE GENERAL ENDOSCOPY

## (undated) DEVICE — SPECIMEN CONTAINER 100ML